# Patient Record
Sex: MALE | Race: WHITE | NOT HISPANIC OR LATINO | Employment: OTHER | URBAN - NONMETROPOLITAN AREA
[De-identification: names, ages, dates, MRNs, and addresses within clinical notes are randomized per-mention and may not be internally consistent; named-entity substitution may affect disease eponyms.]

---

## 2017-09-06 ENCOUNTER — COMMUNICATION - GICH (OUTPATIENT)
Dept: SURGERY | Facility: OTHER | Age: 47
End: 2017-09-06

## 2017-11-14 ENCOUNTER — HISTORY (OUTPATIENT)
Dept: FAMILY MEDICINE | Facility: OTHER | Age: 47
End: 2017-11-14

## 2017-11-14 ENCOUNTER — HOSPITAL ENCOUNTER (OUTPATIENT)
Dept: RADIOLOGY | Facility: OTHER | Age: 47
End: 2017-11-14
Attending: FAMILY MEDICINE

## 2017-11-14 ENCOUNTER — OFFICE VISIT - GICH (OUTPATIENT)
Dept: FAMILY MEDICINE | Facility: OTHER | Age: 47
End: 2017-11-14

## 2017-11-14 DIAGNOSIS — G89.29 OTHER CHRONIC PAIN: ICD-10-CM

## 2017-11-14 DIAGNOSIS — M54.41 LOW BACK PAIN WITH RIGHT-SIDED SCIATICA: ICD-10-CM

## 2017-11-14 DIAGNOSIS — M53.3 SACROCOCCYGEAL DISORDERS, NOT ELSEWHERE CLASSIFIED: ICD-10-CM
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
## 2017-12-28 NOTE — ADDENDUM NOTE
Patient Information     Patient Name MRN Sex Devon Sheikh [id number] Male 1970      Addendum Note by Juan Antonio Palm MD at 2017 11:16 AM     Author:  Juan Antonio Palm MD Service:  (none) Author Type:  Physician     Filed:  2017 11:16 AM Encounter Date:  2017 Status:  Signed     :  Juan Antonio Palm MD (Physician)       Addended by: JUAN ANTONIO PALM on: 2017 11:16 AM        Modules accepted: Orders

## 2017-12-30 NOTE — NURSING NOTE
Patient Information     Patient Name MRN Devon Gallagher [id number] Male 1970      Nursing Note by Lucy Nj at 2017 10:45 AM     Author:  Lucy Nj Service:  (none) Author Type:  (none)     Filed:  2017 10:48 AM Encounter Date:  2017 Status:  Signed     :  Lucy Nj            Patient presents to the clinic with stabbing back pain.  It's happening more frequently lately.  Lucy Nj LPN....................2017 10:42 AM

## 2018-01-22 ENCOUNTER — DOCUMENTATION ONLY (OUTPATIENT)
Dept: FAMILY MEDICINE | Facility: OTHER | Age: 48
End: 2018-01-22

## 2018-01-22 PROBLEM — G57.52 TARSAL TUNNEL SYNDROME, LEFT: Status: ACTIVE | Noted: 2018-01-22

## 2018-01-22 RX ORDER — PREDNISONE 20 MG/1
TABLET ORAL
COMMUNITY
Start: 2017-11-14 | End: 2018-07-05

## 2018-01-22 RX ORDER — FLUOCINONIDE 0.5 MG/G
CREAM TOPICAL 2 TIMES DAILY
COMMUNITY
Start: 2016-05-09 | End: 2018-07-05

## 2018-01-22 RX ORDER — NICOTINE POLACRILEX 4 MG/1
20 GUM, CHEWING ORAL DAILY PRN
COMMUNITY
End: 2022-06-06

## 2018-01-22 RX ORDER — IBUPROFEN 600 MG/1
600 TABLET, FILM COATED ORAL
COMMUNITY
Start: 2014-01-06 | End: 2018-07-05

## 2018-01-26 VITALS
BODY MASS INDEX: 34.8 KG/M2 | WEIGHT: 271.2 LBS | SYSTOLIC BLOOD PRESSURE: 136 MMHG | HEIGHT: 74 IN | DIASTOLIC BLOOD PRESSURE: 86 MMHG

## 2018-07-05 ENCOUNTER — OFFICE VISIT (OUTPATIENT)
Dept: FAMILY MEDICINE | Facility: OTHER | Age: 48
End: 2018-07-05
Attending: FAMILY MEDICINE
Payer: COMMERCIAL

## 2018-07-05 VITALS
HEART RATE: 72 BPM | DIASTOLIC BLOOD PRESSURE: 78 MMHG | SYSTOLIC BLOOD PRESSURE: 154 MMHG | BODY MASS INDEX: 33.88 KG/M2 | WEIGHT: 264 LBS

## 2018-07-05 DIAGNOSIS — M54.42 ACUTE LEFT-SIDED LOW BACK PAIN WITH LEFT-SIDED SCIATICA: Primary | ICD-10-CM

## 2018-07-05 PROCEDURE — 99213 OFFICE O/P EST LOW 20 MIN: CPT | Performed by: FAMILY MEDICINE

## 2018-07-05 RX ORDER — PREDNISONE 20 MG/1
20 TABLET ORAL 2 TIMES DAILY
Qty: 10 TABLET | Refills: 0 | Status: SHIPPED | OUTPATIENT
Start: 2018-07-05 | End: 2019-04-04

## 2018-07-05 NOTE — PROGRESS NOTES
SUBJECTIVE:   Devon Kenney is a 48 year old male who presents to clinic today for the following health issues:  Has had a flare of radicular back pain after bending over to attach a trailer chain at home. It is improving now but he had 2 episodes of loss of bladder control. He feels it was absolutely related to pain jolts and not a neurologis event and he does not have trouble controlling bladder any other time. No BM issues. He had MR 4 years ago w similar sx that was good. He is a  and works hard.      Problem list and histories reviewed & adjusted, as indicated.  Additional history: as documented        Reviewed and updated as needed this visit by clinical staff  Tobacco  Allergies  Meds       Reviewed and updated as needed this visit by Provider         OBJECTIVE:     /78 (BP Location: Right arm, Patient Position: Sitting)  Pulse 72  Wt 264 lb (119.7 kg)  BMI 33.88 kg/m2  Body mass index is 33.88 kg/(m^2).  GENERAL: healthy, alert and no distress  MS: no gross musculoskeletal defects noted, no edema/ has good heel and toe walking and normal active DTRs  He moves slowly and cautiously      ASSESSMENT/PLAN:       1. Acute left-sided low back pain with left-sided sciatica  Prednisone- 5 days as he does not want to be on it when he goes back to work next Wed/ ice/ wt loss/       See Patient Instructions    CANDELARIO GAMBINO MD  [facility]

## 2018-07-05 NOTE — MR AVS SNAPSHOT
"              After Visit Summary   2018    Devon Kenney    MRN: [id number]           Patient Information     Date Of Birth          1970        Visit Information        Provider Department      2018 7:45 AM Juan Antonio Palm MD [facility]        Today's Diagnoses     Acute left-sided low back pain with left-sided sciatica    -  1       Follow-ups after your visit        Who to contact     If you have questions or need follow up information about today's clinic visit or your schedule please contact [facility] directly at [phone].  Normal or non-critical lab and imaging results will be communicated to you by Eurotrihart, letter or phone within 4 business days after the clinic has received the results. If you do not hear from us within 7 days, please contact the clinic through Eurotrihart or phone. If you have a critical or abnormal lab result, we will notify you by phone as soon as possible.  Submit refill requests through MetroGames or call your pharmacy and they will forward the refill request to us. Please allow 3 business days for your refill to be completed.          Additional Information About Your Visit        MyChart Information     MetroGames lets you send messages to your doctor, view your test results, renew your prescriptions, schedule appointments and more. To sign up, go to [email or web address] . Click on \"Log in\" on the left side of the screen, which will take you to the Welcome page. Then click on \"Sign up Now\" on the right side of the page.     You will be asked to enter the access code listed below, as well as some personal information. Please follow the directions to create your username and password.     Your access code is: [email or web address]  Expires: 10/3/2018  8:39 AM     Your access code will  in 90 days. If you need help or a new code, please call your [place] clinic or [phone].        Care EveryWhere ID     This is your Care " EveryWhere ID. This could be used by other organizations to access your [place] medical records  [id number]        Your Vitals Were     Pulse BMI (Body Mass Index)                72 33.88 kg/m2           Blood Pressure from Last 3 Encounters:   07/05/18 154/78   11/14/17 136/86   05/09/16 124/88    Weight from Last 3 Encounters:   07/05/18 264 lb (119.7 kg)   11/14/17 271 lb 3.2 oz (123 kg)   05/09/16 274 lb 9.6 oz (124.6 kg)              Today, you had the following     No orders found for display         Today's Medication Changes          These changes are accurate as of 7/5/18  8:39 AM.  If you have any questions, ask your nurse or doctor.               These medicines have changed or have updated prescriptions.        Dose/Directions    predniSONE 20 MG tablet   Commonly known as:  DELTASONE   This may have changed:    - how much to take  - how to take this  - when to take this  - additional instructions   Used for:  Acute left-sided low back pain with left-sided sciatica   Changed by:  Juan Antonio Palm MD        Dose:  20 mg   Take 1 tablet (20 mg) by mouth 2 times daily   Quantity:  10 tablet   Refills:  0         Stop taking these medicines if you haven't already. Please contact your care team if you have questions.     fluocinonide 0.05 % cream   Commonly known as:  LIDEX   Stopped by:  Juan Antonio Palm MD           ibuprofen 600 MG tablet   Commonly known as:  ADVIL/MOTRIN   Stopped by:  Juan Antonio Palm MD                Where to get your medicines      These medications were sent to [place] [address] [facility] - [place], [place] - [address]  [address] [facility] [address]     Phone:  [phone]     predniSONE 20 MG tablet                Primary Care Provider Office Phone # Fax #    Juan Antonio Palm [phone]       [address] [facility] [address]        Equal Access to Services     HUNG BARCLAY AH: lisha Monge,  antonella kennedydae estela abundiothanh hydeliudmila sahu ah. So [facility] [phone].    ATENCIÓN: Si noemy whaley, tiene a valladares disposición servicios gratuitos de asistencia lingüística. Lukas al [phone].    We comply with applicable federal civil rights laws and [place] laws. We do not discriminate on the basis of race, color, national origin, age, disability, sex, sexual orientation, or gender identity.            Thank you!     Thank you for choosing [facility] AND [facility]  for your care. Our goal is always to provide you with excellent care. Hearing back from our patients is one way we can continue to improve our services. Please take a few minutes to complete the written survey that you may receive in the mail after your visit with us. Thank you!             Your Updated Medication List - Protect others around you: Learn how to safely use, store and throw away your medicines at www.disposemymeds.org.          This list is accurate as of 7/5/18  8:39 AM.  Always use your most recent med list.                   Brand Name Dispense Instructions for use Diagnosis    omeprazole 20 MG tablet      Take 20 mg by mouth daily as needed        predniSONE 20 MG tablet    DELTASONE    10 tablet    Take 1 tablet (20 mg) by mouth 2 times daily    Acute left-sided low back pain with left-sided sciatica

## 2018-07-05 NOTE — NURSING NOTE
Patient here for ongoing lower back pain. He is currently not taking anything for the pain now. Jeni Patton LPN .......................7/5/2018  7:51 AM

## 2018-07-23 NOTE — PROGRESS NOTES
Patient Information     Patient Name  Devon Kenney MRN  [id number] Sex  Male   1970      Letter by Juan Antonio Palm MD at      Author:  Juan Antonio Palm MD Service:  (none) Author Type:  (none)    Filed:   Date of Service:   Status:  (Other)           Devon Kenney  [address] [facility] [address]          2017    Dear Mr. Kenney:    Your do have degenerative arthritis in your lower back which is no surprise. There were no unexpected findings in your xrays.  Juan Antonio Palm MD ....................  2017   2:41 PM

## 2018-07-23 NOTE — PROGRESS NOTES
Patient Information     Patient Name  Devon Kenney MRN  [id number] Sex  Male   1970      Letter by Gabi Gallagher at      Author:  Gabi Gallagher Service:  (none) Author Type:  (none)    Filed:   Encounter Date:  2017 Status:  (Other)           [facility]  [address] [facility] [address]          2017    Dear Mr. Kenney:    It has come to our attention that you are due for a colonoscopy.  According to our records, your last colonoscopy was done on 10/23/2012.  It  is time for your repeat colonoscopy.  Please contact the Surgery department at [phone] and we will be happy to set up this colonoscopy for you.  If you have had a colonoscopy since your last one with us, please let us know so we can update our records.      Sincerely,       Gabi Gallagher  General Surgery      Reviewed and electronically signed by provider.

## 2019-04-04 ENCOUNTER — OFFICE VISIT (OUTPATIENT)
Dept: PEDIATRICS | Facility: OTHER | Age: 49
End: 2019-04-04
Attending: INTERNAL MEDICINE
Payer: COMMERCIAL

## 2019-04-04 VITALS
DIASTOLIC BLOOD PRESSURE: 86 MMHG | WEIGHT: 278 LBS | HEIGHT: 74 IN | BODY MASS INDEX: 35.68 KG/M2 | OXYGEN SATURATION: 98 % | SYSTOLIC BLOOD PRESSURE: 142 MMHG | RESPIRATION RATE: 16 BRPM | TEMPERATURE: 98.7 F | HEART RATE: 94 BPM

## 2019-04-04 DIAGNOSIS — Z87.891 HISTORY OF TOBACCO USE: ICD-10-CM

## 2019-04-04 DIAGNOSIS — R05.3 CHRONIC COUGH: Primary | ICD-10-CM

## 2019-04-04 PROCEDURE — 99213 OFFICE O/P EST LOW 20 MIN: CPT | Performed by: INTERNAL MEDICINE

## 2019-04-04 RX ORDER — BENZONATATE 100 MG/1
100 CAPSULE ORAL 3 TIMES DAILY PRN
Qty: 90 CAPSULE | Refills: 1 | Status: SHIPPED | OUTPATIENT
Start: 2019-04-04 | End: 2022-06-06

## 2019-04-04 ASSESSMENT — MIFFLIN-ST. JEOR: SCORE: 2191

## 2019-04-04 ASSESSMENT — PAIN SCALES - GENERAL: PAINLEVEL: NO PAIN (0)

## 2019-04-04 NOTE — PATIENT INSTRUCTIONS
-- Schedule breathing test   -- Schedule lung CT   -- consider neck imaging   -- Trial of Tessalon, first at night. Watch for drowsiness.   -- Neti pot   -- Establish with a therapist   -- Follow-up in a few weeks after testing    [place] counselors/therapists   Telephone Hours Kids? Address   [facility]  (Many counselors) [phone] M-Th 8-5  F 8-12 Yes [address]   [email or web address]   [facility]  (Many counselors) [phone]  Yes [address]   [email or web address]   [facility]  (Many counselors) [phone]  Yes [address] [place]  [email or web address]://[email or web address]   [facility]  Bill Ankush Guevara [phone]  Yes [address]   [address]  [email or web address]   [facility]  Christos Kay [phone]  Yes [address]   Uzma Cortes [phone]   [address]  [email or web address]   Tiffanie Singleton [phone]   [address]   Rosa Gibson [phone]   [address] [place] [address]   Maren Kong [phone]  Yes [address]   Kimberlydanielle Deyvi [phone]   [address] [facility] [place]    Dequan Mckinnon [phone]   [address] [place] [address]   Shellie Randall [phone]   [address]   [address] [place] [address]   [email or web address]   Odette Zheng [phone]   [address] [place] [address] [address]  ([facility])  [email or web address]   [facility]  David Chadwick [phone]   [address] [facility]  Michele Rinne Cindy Thomas [phone]      [facility]  Darryl Ghosh CNP [phone] M-F 8-5 Yes [address] [place], [place]  [email or web address]   [facility] [place] [phone]  Yes TORRIE Guerra  [address]  http://[email or web address]/   [facility] [place] [phone]  Yes TORRIE Lozano  [address] [place] [address]  [email or web address]://[email or web address]

## 2019-04-04 NOTE — PROGRESS NOTES
Subjective  Devon Kenney is a 48 year old male who presents for approximately 1 year of cough. He associates the onset of his cough with a very stressful time at work. However, since the time of onset he feels a constant need to adjust his neck and clear his throat. He notes that his cough worsens throughout the day, worsens with heavy breathing, keeps him awake many nights, is associated with a foul scent a few times a week that he knows is not due to him or his surroundings at the time, and he has occasional voice changes, and a sensation of a dry throat. He denies any subjective fevers, N/V/D, hemoptysis or other production, wheezing, seasonal allergies, weight loss, travel outside [place], heavy alcohol use (at this time), visual changes, urinary changes, abdominal/chest pain, anxiety, or painful/difficult swallowing. He does endorse headaches with tunnel vision, an 18 year smoking history, past consumption of alcohol, reflux that bothers him separate from his cough, and trouble sleeping. He has been taking omeprazole for several years and has severe reflux symptoms if he misses a dose.   Carlton has brings up that he is very hard on his body and is often sleep deprived. He is concerned that his cough could also be due to neck strain/pain that he says has been brought to the attention of a doctor in the past.     Problem List/PMH: reviewed in EMR, and made relevant updates today.  Medications: reviewed in EMR, and made relevant updates today.  Allergies: reviewed in EMR, and made relevant updates today.    Social Hx:  Social History     Tobacco Use     Smoking status: Former Smoker     Packs/day: 2.00     Years: 18.00     Pack years: 36.00     Types: Cigarettes     Last attempt to quit: 10/15/2006     Years since quittin.4     Smokeless tobacco: Never Used   Substance Use Topics     Alcohol use: Yes     Alcohol/week: 1.2 oz     Types: 2 Cans of beer per week     Drug use: No     Social History     Social History  "Narrative    Two children.    Works as an  for [employer].     I reviewed social history and made relevant updates today.    Family Hx:   Family History   Problem Relation Age of Onset     Colon Cancer Paternal Grandfather         Cancer-colon     Prostate Cancer Paternal Grandfather         Cancer-prostate     Breast Cancer Mother         Cancer-breast     Other - See Comments Mother          lymphoma     Hypertension Maternal Grandmother         Hypertension     Genetic Disorder Other         Genetic,No family history of diabetes or coronary disease.-Grandfather  Colon and prostate cancer but lived into his 90s.  -Mother Breast cancer and lymphoma. -Grandmother Hypertension.  -Son Autism.     Other - See Comments Son         Autism     Asthma No family hx of        Objective  Vitals: reviewed in EMR.  /86   Pulse 94   Temp 98.7  F (37.1  C) (Tympanic)   Resp 16   Ht 1.88 m (6' 2.02\")   Wt 126.1 kg (278 lb)   SpO2 98%   BMI 35.68 kg/m      Gen: Pleasant male, NAD.  HEENT: PERRLA, MMM, no OP erythema. Ear canals and TMs without erythema or discharge. There is no pre/post-auricular/submandiubular/cervical/supraclavicular/axillary lymphadenopathy.   Neck: Supple, no JVD. The patient is mildly tender to palpation just lateral to the trachea on the L side, and reports feeling a sensation of fullness that radiates to the posterior neck.  CV: RRR no m/r/g.   Pulm: CTAB no w/r/r   GI: Soft, NT, ND. No HSM. No masses. Bowel sounds present.  Neuro: Grossly intact  Msk: No lower extremity edema.  Skin: No concerning lesions.  Psychiatric: Normal affect and insight. Does not appear anxious or depressed.    Assessment    ICD-10-CM    1. Chronic cough R05 Pulmonary Function Test ()     CT Chest Low Dose Non Contrast     benzonatate (TESSALON) 100 MG capsule   2. History of tobacco use Z87.891 Pulmonary Function Test ()     CT Chest Low Dose Non Contrast     benzonatate (TESSALON) " "100 MG capsule     Orders Placed This Encounter   Procedures     CT Chest Low Dose Non Contrast     Pulmonary Function Test ()       Devon Kenney is a 48 year old male who presents for approximately 1 year of cough. The differential is broad. Given the patient's smoking history COPD and/or asthma should be ruled out via PFTs. Also because of his significant smoking history he should undergo a low-dose CT scan to rule out lung malignancies. His smoking in addition to previous alcohol use also puts him at risk for SCC or other throat cancer, which should be investigated via head/neck CT if workup is otherwise negative. Psychogenic \"habit\" cough is possible given that he associates the cough onset with a difficult time at work, so he may benefit from counseling. Carlton's GERD seems to be well controlled with omeprazole and the symptoms appear at different times, but increasing his PPI dose could also be considered. Although he does not endorse having allergies a Neti-pot and other allergen avoidence maybe beneficial. Musculoskeletal etiologies are also possible considering that Carlton is very hard on his body.    Plan   -- Expected clinical course discussed   -- Medications and their side effects discussed  Patient Instructions      -- Schedule breathing test   -- Schedule lung CT   -- consider neck imaging   -- Trial of Tessalon, first at night. Watch for drowsiness.   -- Neti pot   -- Establish with a therapist   -- Follow-up in a few weeks after testing    [place] counselors/therapists   Telephone Hours Kids? Address   [facility]  (Many counselors) [phone] M-Th 8-5  F 8-12 Yes [address] [place]   [email or web address]   [facility]  (Many counselors) [phone]  Yes [address]   [email or web address]://[email or web address]   [facility]  (Many counselors) [phone]  Yes [address] [place]  http://[email or web address]   Ankush Khan " Ankush Guevara [phone]  Yes [address]   [address]  [email or web address]   [facility]  Christos Kay [phone]  Yes [address]   Uzma Cortes [phone]   [address]  [email or web address]   Tiffanie Singleton [phone]   [address]   Rosa Gibson [phone]   [address]   Maren Kong [phone]  Yes [address]   Benji Carnes [phone]   [address] [place]    Dequan Mckinnon [phone]   [address]   Shellie Pereira [phone]   [address]   [email or web address]   Odette Zheng [phone]   [address] [facility] [address]  ([facility])  [email or web address]   [facility]  David Chadwick [phone]   [address] [facility]  Michele Rinne Cindy Thomas [phone]      [facility]  Darryl Ghosh CNP [phone] M-F 8-5 Yes [address] [place], [place]  [email or web address]   [facility] [place] [phone]  Yes TORRIE Guerra  [address]  [email or web address]://[email or web address]   [facility] [place] [phone]  Yes TORRIE Lozano  [address] [facility]  [email or web address]         Return in about 1 month (around 5/4/2019) for follow-up results.    Murray Ritchie MS3  [employer]    Attestation Statement:  I was present with the medical student who participated in the service and in the documentation of this note. I have verified the history and personally performed the physical exam and medical decision making, and have verified the content of the note which accurately reflects my assessment of the patient and the plan of care.    Js Dawson MD  Internal Medicine & Pediatrics  4/6/2019 3:59 PM

## 2019-04-04 NOTE — NURSING NOTE
Patient presents to clinic for ongoing cough that has been going on for 8-10.  Adrienne Wiley LPN ....................  4/4/2019   11:05 AM    No LMP for male patient.  Medication Reconciliation: complete    Adrienne Wiley LPN  4/4/2019 11:05 AM

## 2019-04-10 ENCOUNTER — HOSPITAL ENCOUNTER (OUTPATIENT)
Dept: CT IMAGING | Facility: OTHER | Age: 49
Discharge: HOME | End: 2019-04-10
Attending: INTERNAL MEDICINE | Admitting: INTERNAL MEDICINE
Payer: COMMERCIAL

## 2019-04-10 DIAGNOSIS — Z87.891 HISTORY OF TOBACCO USE: ICD-10-CM

## 2019-04-10 DIAGNOSIS — R05.3 CHRONIC COUGH: ICD-10-CM

## 2019-04-10 PROCEDURE — 71250 CT THORAX DX C-: CPT

## 2019-05-02 ENCOUNTER — HOSPITAL ENCOUNTER (OUTPATIENT)
Dept: RESPIRATORY THERAPY | Facility: OTHER | Age: 49
Discharge: HOME | End: 2019-05-02
Attending: INTERNAL MEDICINE | Admitting: INTERNAL MEDICINE
Payer: COMMERCIAL

## 2019-05-02 DIAGNOSIS — R05.3 CHRONIC COUGH: ICD-10-CM

## 2019-05-02 DIAGNOSIS — Z87.891 HISTORY OF TOBACCO USE: ICD-10-CM

## 2019-05-02 PROCEDURE — 94729 DIFFUSING CAPACITY: CPT | Mod: 26 | Performed by: INTERNAL MEDICINE

## 2019-05-02 PROCEDURE — 94729 DIFFUSING CAPACITY: CPT

## 2019-05-02 PROCEDURE — 94010 BREATHING CAPACITY TEST: CPT

## 2019-05-02 PROCEDURE — 94726 PLETHYSMOGRAPHY LUNG VOLUMES: CPT | Mod: 26 | Performed by: INTERNAL MEDICINE

## 2019-05-02 PROCEDURE — 40000275 ZZH STATISTIC RCP TIME EA 10 MIN

## 2019-05-02 PROCEDURE — 94010 BREATHING CAPACITY TEST: CPT | Mod: 26 | Performed by: INTERNAL MEDICINE

## 2019-05-02 PROCEDURE — 94726 PLETHYSMOGRAPHY LUNG VOLUMES: CPT

## 2019-06-18 ENCOUNTER — OFFICE VISIT (OUTPATIENT)
Dept: PEDIATRICS | Facility: OTHER | Age: 49
End: 2019-06-18
Attending: INTERNAL MEDICINE
Payer: COMMERCIAL

## 2019-06-18 VITALS
OXYGEN SATURATION: 97 % | TEMPERATURE: 98 F | SYSTOLIC BLOOD PRESSURE: 150 MMHG | WEIGHT: 274 LBS | BODY MASS INDEX: 35.16 KG/M2 | RESPIRATION RATE: 20 BRPM | DIASTOLIC BLOOD PRESSURE: 82 MMHG | HEART RATE: 93 BPM

## 2019-06-18 DIAGNOSIS — K21.9 GASTROESOPHAGEAL REFLUX DISEASE, ESOPHAGITIS PRESENCE NOT SPECIFIED: ICD-10-CM

## 2019-06-18 DIAGNOSIS — R05.3 CHRONIC COUGH: Primary | ICD-10-CM

## 2019-06-18 PROCEDURE — 99213 OFFICE O/P EST LOW 20 MIN: CPT | Performed by: INTERNAL MEDICINE

## 2019-06-18 NOTE — NURSING NOTE
Chief Complaint   Patient presents with     Follow Up     PFT/cough         Medication Reconciliation: complete    Juhi Montejo, LPN

## 2019-06-18 NOTE — PROGRESS NOTES
Subjective  Devon Kenney is a 48 year old male who presents for follow-up cough, breathing test.  He continues to have a tickling cough.  He feels like something is present in the left side of his throat.  Chronic heartburn which is well controlled with omeprazole.  If he misses a dose he realizes it because the heartburn worsens.  No dysphasia or odynophagia.  He typically sleeps on his left side.  He notices that if he gets real angry at the dog he has whistling in his breath.  He thinks it is inhaling and exhaling.    Problem List/PMH: reviewed in EMR, and made relevant updates today.  Medications: reviewed in EMR, and made relevant updates today.  Allergies: reviewed in EMR, and made relevant updates today.    Social Hx:  Social History     Tobacco Use     Smoking status: Former Smoker     Packs/day: 2.00     Years: 18.00     Pack years: 36.00     Types: Cigarettes     Last attempt to quit: 10/15/2006     Years since quittin.6     Smokeless tobacco: Never Used   Substance Use Topics     Alcohol use: Yes     Alcohol/week: 1.2 oz     Types: 2 Cans of beer per week     Drug use: No     Social History     Social History Narrative    Two children.    Works as an  for [employer].     I reviewed social history and made relevant updates today.    Family Hx:   Family History   Problem Relation Age of Onset     Colon Cancer Paternal Grandfather         Cancer-colon     Prostate Cancer Paternal Grandfather         Cancer-prostate     Breast Cancer Mother         Cancer-breast     Other - See Comments Mother          lymphoma     Hypertension Maternal Grandmother         Hypertension     Genetic Disorder Other         Genetic,No family history of diabetes or coronary disease.-Grandfather  Colon and prostate cancer but lived into his 90s.  -Mother Breast cancer and lymphoma. -Grandmother Hypertension.  -Son Autism.     Other - See Comments Son         Autism     Asthma No family hx of         Objective  Vitals: reviewed in EMR.  /82 (BP Location: Right arm, Patient Position: Sitting, Cuff Size: Adult Large)   Pulse 93   Temp 98  F (36.7  C)   Resp 20   Wt 124.3 kg (274 lb)   SpO2 97%   BMI 35.16 kg/m      Gen: Pleasant male, NAD.  HEENT: MMM, no OP erythema.   Neck: Supple, no masses, no palpable lymphadenopathy.  CV: RRR no m/r/g.   Pulm: CTAB no w/r/r  Neuro: Grossly intact  Msk: No lower extremity edema.  Skin: No concerning lesions.  Psychiatric: Normal affect and insight. Does not appear anxious or depressed.    Pulmonary function testing dated May 2, 2019 study and report was personally reviewed with the patient today.      Assessment    ICD-10-CM    1. Chronic cough R05 OTOLARYNGOLOGY REFERRAL   2. Gastroesophageal reflux disease, esophagitis presence not specified K21.9 OTOLARYNGOLOGY REFERRAL     Orders Placed This Encounter   Procedures     OTOLARYNGOLOGY REFERRAL       Differential diagnosis includes gastroesophageal reflux disease with pharyngeal inflammation, oral pharyngeal malignancy, vocal cord dysfunction, others.    Plan   -- Expected clinical course discussed   -- Medications and their side effects discussed  Patient Instructions    -- ENT consult to consider GERD ulcer on voice box vs vocal cord dysfunction vs tumor or other      Signed, Js Patel MD  Internal Medicine & Pediatrics

## 2019-07-30 ENCOUNTER — OFFICE VISIT (OUTPATIENT)
Dept: OTOLARYNGOLOGY | Facility: OTHER | Age: 49
End: 2019-07-30
Attending: OTOLARYNGOLOGY
Payer: COMMERCIAL

## 2019-07-30 DIAGNOSIS — K21.9 LPRD (LARYNGOPHARYNGEAL REFLUX DISEASE): Primary | ICD-10-CM

## 2019-07-30 PROCEDURE — G0463 HOSPITAL OUTPT CLINIC VISIT: HCPCS

## 2019-08-05 NOTE — PROGRESS NOTES
document embedded image  Patient Name:  Devon Kenney  YOB: 1970  MR Number:  [id number]  Acct Number: [id number]    cc: ~    ENT Progress Note    Date: 07/30/19    Visit Reasons: Throat issues/voice change when talking loud    HPI  HPI  HPI: Chief complaint:  Throat complaints    History  The patient is a 49-year-old patient with known GERD who comes to the office today with variable voice issues, throat clearing, and intermittent tight feeling in his hypopharynx when he swallows.  He is a former smoker.  He quit 12 years ago.  He does have fears possible malignancy.  He denies hemoptysis, hematemesis, dysphagia, odynophagia, otalgia.    Exam  Oral cavity oropharynx-free of mucosal lesion  Nasal-no obstruction or purulence  Neck-no masses or adenopathy  Head neck integument-Clear  Indirect laryngoscopy-he has posterior laryngeal erythema and edema without evidence of a neoplasm.  His larynx is neurologically intact.  His hypopharynx is clear.  General-the patient appears well and in no distress  Neuro-there are no focal cranial nerve deficits appreciated    Allergies    No Known Allergies Allergy (Unverified 07/31/19 09:29)    PFSH  PFSH  PFSH:   Medical History (Reviewed 07/31/19 @ 09:29 by Jeni Kong CMA)    Diagnosis unknown (Acute)  no eCW Hx  Tinnitus (Acute)    Family History (Reviewed 07/31/19 @ 09:29 by Jeni Kong CMA)  Other  Cancer        Social History  Smoking Status:  Former smoker        A&P  Assessment & Plan  (1) LPRD (laryngopharyngeal reflux disease):         Code(s):  K21.9 - Gastro-esophageal reflux disease without esophagitis        The patient was counseled that his symptoms are most likely secondary to breakthrough reflux.  We will increase his omeprazole to twice daily for 3 months.  He is welcome to follow up if this does not resolve his issues.  He was reassured I see no evidence of head neck malignancy.  Departure  Other Medications:        New:     omeprazole 20 mg  PO BID 90 days 180 caps 0RF            David Linares MD              07/30/19 1014   <Electronically signed by David Linares MD> 08/01/19 0944

## 2019-10-10 ENCOUNTER — RESULTS ONLY (OUTPATIENT)
Dept: LAB | Age: 49
End: 2019-10-10

## 2019-10-10 ENCOUNTER — APPOINTMENT (OUTPATIENT)
Dept: FAMILY MEDICINE | Facility: OTHER | Age: 49
End: 2019-10-10
Attending: CHIROPRACTOR

## 2019-10-10 PROCEDURE — 99499 UNLISTED E&M SERVICE: CPT | Performed by: CHIROPRACTOR

## 2019-10-10 PROCEDURE — 40001068 ZZHCL STATISTIC UA W/ REFLEX TO MICRO GICH: Mod: ZL | Performed by: CHIROPRACTOR

## 2019-10-10 RX ORDER — OMEPRAZOLE 40 MG/1
CAPSULE, DELAYED RELEASE ORAL
Refills: 0 | COMMUNITY
Start: 2019-08-16 | End: 2022-06-06

## 2019-10-11 LAB
ALBUMIN UR-MCNC: NORMAL MG/DL
APPEARANCE UR: NORMAL
BILIRUB UR QL STRIP: NORMAL
COLOR UR AUTO: NORMAL
GLUCOSE UR STRIP-MCNC: NORMAL MG/DL
HGB UR QL STRIP: NORMAL
KETONES UR STRIP-MCNC: NORMAL MG/DL
LEUKOCYTE ESTERASE UR QL STRIP: NORMAL
NITRATE UR QL: NORMAL
PH UR STRIP: NORMAL PH (ref 5–7)
SOURCE: NORMAL
SP GR UR STRIP: NORMAL (ref 1–1.03)
UROBILINOGEN UR STRIP-ACNC: NORMAL EU/DL (ref 0.2–1)

## 2021-12-15 ENCOUNTER — IMMUNIZATION (OUTPATIENT)
Dept: FAMILY MEDICINE | Facility: OTHER | Age: 51
End: 2021-12-15
Attending: FAMILY MEDICINE
Payer: COMMERCIAL

## 2021-12-15 PROCEDURE — 0004A PR COVID VAC PFIZER DIL RECON 30 MCG/0.3 ML IM: CPT

## 2021-12-15 PROCEDURE — 91300 PR COVID VAC PFIZER DIL RECON 30 MCG/0.3 ML IM: CPT

## 2022-06-06 ENCOUNTER — OFFICE VISIT (OUTPATIENT)
Dept: PEDIATRICS | Facility: OTHER | Age: 52
End: 2022-06-06
Attending: INTERNAL MEDICINE
Payer: COMMERCIAL

## 2022-06-06 VITALS
HEART RATE: 76 BPM | HEIGHT: 73 IN | WEIGHT: 228.7 LBS | BODY MASS INDEX: 30.31 KG/M2 | TEMPERATURE: 97.5 F | DIASTOLIC BLOOD PRESSURE: 88 MMHG | OXYGEN SATURATION: 99 % | RESPIRATION RATE: 16 BRPM | SYSTOLIC BLOOD PRESSURE: 146 MMHG

## 2022-06-06 DIAGNOSIS — R10.13 ABDOMINAL PAIN, EPIGASTRIC: Primary | ICD-10-CM

## 2022-06-06 DIAGNOSIS — K52.9 CHRONIC DIARRHEA: ICD-10-CM

## 2022-06-06 DIAGNOSIS — K21.00 GASTROESOPHAGEAL REFLUX DISEASE WITH ESOPHAGITIS, UNSPECIFIED WHETHER HEMORRHAGE: ICD-10-CM

## 2022-06-06 LAB
ALBUMIN SERPL-MCNC: 4.5 G/DL (ref 3.5–5.7)
ALP SERPL-CCNC: 35 U/L (ref 34–104)
ALT SERPL W P-5'-P-CCNC: 17 U/L (ref 7–52)
AMYLASE SERPL-CCNC: 32 U/L (ref 29–103)
ANION GAP SERPL CALCULATED.3IONS-SCNC: 5 MMOL/L (ref 3–14)
AST SERPL W P-5'-P-CCNC: 16 U/L (ref 13–39)
BASOPHILS # BLD MANUAL: 0 10E3/UL (ref 0–0.2)
BASOPHILS NFR BLD MANUAL: 0 %
BILIRUB SERPL-MCNC: 0.5 MG/DL (ref 0.3–1)
BUN SERPL-MCNC: 15 MG/DL (ref 7–25)
CALCIUM SERPL-MCNC: 9.6 MG/DL (ref 8.6–10.3)
CHLORIDE BLD-SCNC: 106 MMOL/L (ref 98–107)
CO2 SERPL-SCNC: 29 MMOL/L (ref 21–31)
CREAT SERPL-MCNC: 1.01 MG/DL (ref 0.7–1.3)
CRP SERPL-MCNC: 6.3 MG/L
EOSINOPHIL # BLD MANUAL: 0 10E3/UL (ref 0–0.7)
EOSINOPHIL NFR BLD MANUAL: 0 %
ERYTHROCYTE [DISTWIDTH] IN BLOOD BY AUTOMATED COUNT: 12.1 % (ref 10–15)
ERYTHROCYTE [SEDIMENTATION RATE] IN BLOOD BY WESTERGREN METHOD: 4 MM/HR (ref 0–20)
GFR SERPL CREATININE-BSD FRML MDRD: 89 ML/MIN/1.73M2
GLUCOSE BLD-MCNC: 102 MG/DL (ref 70–105)
HCT VFR BLD AUTO: 47.2 % (ref 40–53)
HGB BLD-MCNC: 16.4 G/DL (ref 13.3–17.7)
LIPASE SERPL-CCNC: 22 U/L (ref 11–82)
LYMPHOCYTES # BLD MANUAL: 4.9 10E3/UL (ref 0.8–5.3)
LYMPHOCYTES NFR BLD MANUAL: 47 %
MCH RBC QN AUTO: 32 PG (ref 26.5–33)
MCHC RBC AUTO-ENTMCNC: 34.7 G/DL (ref 31.5–36.5)
MCV RBC AUTO: 92 FL (ref 78–100)
MONOCYTES # BLD MANUAL: 0.2 10E3/UL (ref 0–1.3)
MONOCYTES NFR BLD MANUAL: 2 %
NEUTROPHILS # BLD MANUAL: 5.3 10E3/UL (ref 1.6–8.3)
NEUTROPHILS NFR BLD MANUAL: 51 %
PLAT MORPH BLD: ABNORMAL
PLATELET # BLD AUTO: 165 10E3/UL (ref 150–450)
POTASSIUM BLD-SCNC: 4.9 MMOL/L (ref 3.5–5.1)
PROT SERPL-MCNC: 7.2 G/DL (ref 6.4–8.9)
RBC # BLD AUTO: 5.13 10E6/UL (ref 4.4–5.9)
RBC MORPH BLD: ABNORMAL
SMUDGE CELLS BLD QL SMEAR: PRESENT
SODIUM SERPL-SCNC: 140 MMOL/L (ref 134–144)
TSH SERPL DL<=0.005 MIU/L-ACNC: 2.57 MU/L (ref 0.4–4)
WBC # BLD AUTO: 10.4 10E3/UL (ref 4–11)

## 2022-06-06 PROCEDURE — 86803 HEPATITIS C AB TEST: CPT | Mod: ZL | Performed by: INTERNAL MEDICINE

## 2022-06-06 PROCEDURE — 99214 OFFICE O/P EST MOD 30 MIN: CPT | Performed by: INTERNAL MEDICINE

## 2022-06-06 PROCEDURE — 82150 ASSAY OF AMYLASE: CPT | Mod: ZL | Performed by: INTERNAL MEDICINE

## 2022-06-06 PROCEDURE — 85007 BL SMEAR W/DIFF WBC COUNT: CPT | Mod: ZL | Performed by: INTERNAL MEDICINE

## 2022-06-06 PROCEDURE — 83690 ASSAY OF LIPASE: CPT | Mod: ZL | Performed by: INTERNAL MEDICINE

## 2022-06-06 PROCEDURE — 36415 COLL VENOUS BLD VENIPUNCTURE: CPT | Mod: ZL | Performed by: INTERNAL MEDICINE

## 2022-06-06 PROCEDURE — 80053 COMPREHEN METABOLIC PANEL: CPT | Mod: ZL | Performed by: INTERNAL MEDICINE

## 2022-06-06 PROCEDURE — 85652 RBC SED RATE AUTOMATED: CPT | Mod: ZL | Performed by: INTERNAL MEDICINE

## 2022-06-06 PROCEDURE — 84443 ASSAY THYROID STIM HORMONE: CPT | Mod: ZL | Performed by: INTERNAL MEDICINE

## 2022-06-06 PROCEDURE — 86140 C-REACTIVE PROTEIN: CPT | Mod: ZL | Performed by: INTERNAL MEDICINE

## 2022-06-06 PROCEDURE — 85027 COMPLETE CBC AUTOMATED: CPT | Mod: ZL | Performed by: INTERNAL MEDICINE

## 2022-06-06 RX ORDER — SUCRALFATE 1 G/1
1 TABLET ORAL 4 TIMES DAILY
Qty: 300 TABLET | Refills: 3 | Status: SHIPPED | OUTPATIENT
Start: 2022-06-06 | End: 2023-11-06

## 2022-06-06 RX ORDER — FAMOTIDINE 20 MG/1
20 TABLET, FILM COATED ORAL 2 TIMES DAILY PRN
Qty: 180 TABLET | Refills: 3 | Status: SHIPPED | OUTPATIENT
Start: 2022-06-06 | End: 2023-06-06

## 2022-06-06 ASSESSMENT — PAIN SCALES - GENERAL: PAINLEVEL: NO PAIN (0)

## 2022-06-06 NOTE — PROGRESS NOTES
Assessment & Plan   1. Abdominal pain, epigastric  2. Chronic diarrhea  3. Gastroesophageal reflux disease with esophagitis, unspecified whether hemorrhage  Mr. Kenney is a 51-year-old gentleman with a history of several months to a year of upper abdominal discomfort, loose stools and acid reflux.  Differential diagnosis includes inflammatory bowel disease, celiac disease, giardiasis, drinking alcohol to excess, hyperthyroidism, undertreated gastroesophageal reflux disease, chronic pancreatitis, viral or alcoholic hepatitis, gastritis, peptic ulcer disease, eosinophilic esophagitis, others.  An extensive list of serologies and stool sample studies were ordered as outlined below.  We discussed medication options and decided to increase the dose of PPI as well as add sucralfate and famotidine.  If all lab work is returning relatively normal would recommend EGD/colonoscopy as a next step.  If significant abnormalities of LFTs or others may consider imaging first.  Low threshold for expert consultation.  - CBC with Platelets & Differential; Future  - Comprehensive metabolic panel; Future  - Lipase; Future  - Amylase; Future  - TSH; Future  - Hepatitis C Screen Reflex to HCV RNA Quant and Genotype; Future  - CRP inflammation; Future  - Erythrocyte sedimentation rate auto; Future  - Fecal Lactoferrin; Future  - Enteric Bacteria and Virus Panel by [facility] Stool; Future  - Cryptosporidium and Giardia antigens; Future  - Clostridium difficile toxin B PCR; Future  - omeprazole (PRILOSEC) 20 MG DR capsule; Take 1 capsule (20 mg) by mouth 2 times daily  Dispense: 180 capsule; Refill: 3  - sucralfate (CARAFATE) 1 GM tablet; Take 1 tablet (1 g) by mouth 4 times daily  Dispense: 300 tablet; Refill: 3  - famotidine (PEPCID) 20 MG tablet; Take 1 tablet (20 mg) by mouth 2 times daily as needed (GERD)  Dispense: 180 tablet; Refill: 3  - CBC with Platelets & Differential  - Comprehensive metabolic panel  - Lipase  - Amylase  - TSH  -  Hepatitis C Screen Reflex to HCV RNA Quant and Genotype  - CRP inflammation  - Erythrocyte sedimentation rate auto      Patient Instructions      -- Increase omeprazole (Prilosec) to 20 mg twice daily   -- Use famotidine (Pepcid) 20 mg twice daily as needed for heart burn, upset stomach.   -- Use sucralfate (Carafate) 2-3 times per day to allow stomach lining to heal   -- Antacids are okay to use as well as needed (eg Tums)   -- Avoid trigger foods (eg spicy foods, caffeine, alcohol -- see longer list below)   -- Larger meal earlier in the day, smallest meal later in the day   -- Avoid NSAIDs (eg ibuprofen/Advil, naproxen/Aleve)   -- Work on healthy weight   -- Reduce stress in your life   -- After your symptoms have improved (around 30 days) consider stopping omeprazole   -- Okay to continue famotidine as needed     -- if all labs are normal, will order EGD/colonoscopy   -- May consider CT scan abdomen first    Check your Blood Pressure   -- Sit in a chair, feet flat on the floor for 5 minutes   -- Avoid caffeine, exercise and smoking for 30 minutes before checking   -- Have your arm at the level of your heart   -- Make sure you have the correct size cuff   -- Write them down, bring your log book in to your appointment     -- Goal blood pressure 120/70   -- Learn about DASH Diet for dietary ways to reduce blood pressure  1. Google: NIH DASH diet  2. [facility] site: https://www.nhlbi.nih.gov/health-topics/dash-eating-plan  3. PDF from [facility]: https://www.nhlbi.nih.gov/files/docs/public/heart/new_dash.pdf   -- Work on 5% weight loss   -- Daily physical exercise (eg. 30 min brisk walk)          Patient Education   Lifestyle Changes for Controlling GERD  When you have GERD, stomach acid feels as if it s backing up toward your mouth. Whether or not you take medicine to control your GERD, your symptoms can often be improved with lifestyle changes. Talk to your healthcare provider about the following suggestions. These suggestions  may help you get relief from your symptoms.      Raise your head  Reflux is more likely to strike when you re lying down flat, because stomach fluid can flow backward more easily. Raising the head of your bed 4 to 6 inches can help. To do this:    Slide blocks or books under the legs at the head of your bed. Or, place a wedge under the mattress. Many foam stores can make a suitable wedge for you. The wedge should run from your waist to the top of your head.    Don t just prop your head on several pillows. This increases pressure on your stomach. It can make GERD worse.  Watch your eating habits  Certain foods may increase the acid in your stomach or relax the lower esophageal sphincter. This makes GERD more likely. It s best to avoid the following if they cause you symptoms:    Coffee, tea, and carbonated drinks (with and without caffeine)    Fatty, fried, or spicy food    Mint, chocolate, onions, and tomatoes    Peppermint    Any other foods that seem to irritate your stomach or cause you pain  Relieve the pressure  Tips include the following:    Eat smaller meals, even if you have to eat more often.    Don t lie down right after you eat. Wait a few hours for your stomach to empty.    Avoid tight belts and tight-fitting clothes.    Lose excess weight.  Tobacco and alcohol  Avoid smoking tobacco and drinking alcohol. They can make GERD symptoms worse.  Date Last Reviewed: 7/1/2016 2000-2017 [employer]. [address] [place] [address]. All rights reserved. This information is not intended as a substitute for professional medical care. Always follow your healthcare professional's instructions.        Return if symptoms worsen or fail to improve.    Signed, Js Patel MD, FAAP, FACP  Internal Medicine & Pediatrics    Subjective   Devon Kenney is a 51 year old male who presents for ongoing stomach issues.  They have been present for at least 6 to 12 months.  He feels a grumbling in his  "upper abdomen with some discomfort.  It happens on a daily basis.  It seems to be worse in the morning or with lying on his right side and better with laying on his back.  No specific foods seem to bother him.  He had been a  for 30 years and is now retired.  He started drinking coffee after he retired.  He continues to drink a few beers a day although he thinks this is less since snf.  His diet has significantly improved and he has lost 40 pounds.  He attributes this to much fewer restaurant meals.  He has loose stools every day after breakfast.  No red or black stools.  He has around 2 loose stools per day.  No vomiting.  He has acid reflux which is worse if he forgets to take his PPI.    Objective   Vitals: BP (!) 146/88   Pulse 76   Temp 97.5  F (36.4  C) (Tympanic)   Resp 16   Ht 1.854 m (6' 1\")   Wt 103.7 kg (228 lb 11.2 oz)   SpO2 99%   BMI 30.17 kg/m      Cardiovascular: Regular rate and rhythm, no murmur  Pulmonary: Clear  Abdomen: Soft, nondistended.  Bowel sounds hyperactive.  Mild tenderness to palpation throughout the upper half of the abdomen without masses.  No hepatosplenomegaly.      "

## 2022-06-06 NOTE — PATIENT INSTRUCTIONS
-- Increase omeprazole (Prilosec) to 20 mg twice daily   -- Use famotidine (Pepcid) 20 mg twice daily as needed for heart burn, upset stomach.   -- Use sucralfate (Carafate) 2-3 times per day to allow stomach lining to heal   -- Antacids are okay to use as well as needed (eg Tums)   -- Avoid trigger foods (eg spicy foods, caffeine, alcohol -- see longer list below)   -- Larger meal earlier in the day, smallest meal later in the day   -- Avoid NSAIDs (eg ibuprofen/Advil, naproxen/Aleve)   -- Work on healthy weight   -- Reduce stress in your life   -- After your symptoms have improved (around 30 days) consider stopping omeprazole   -- Okay to continue famotidine as needed     -- if all labs are normal, will order EGD/colonoscopy   -- May consider CT scan abdomen first    Check your Blood Pressure   -- Sit in a chair, feet flat on the floor for 5 minutes   -- Avoid caffeine, exercise and smoking for 30 minutes before checking   -- Have your arm at the level of your heart   -- Make sure you have the correct size cuff   -- Write them down, bring your log book in to your appointment     -- Goal blood pressure 120/70   -- Learn about DASH Diet for dietary ways to reduce blood pressure  Google: NIH DASH diet  NIH site: https://www.nhlbi.nih.gov/health-topics/dash-eating-plan  PDF from [facility]: https://www.nhlbi.nih.gov/files/docs/public/heart/new_dash.pdf   -- Work on 5% weight loss   -- Daily physical exercise (eg. 30 min brisk walk)          Patient Education   Lifestyle Changes for Controlling GERD  When you have GERD, stomach acid feels as if it s backing up toward your mouth. Whether or not you take medicine to control your GERD, your symptoms can often be improved with lifestyle changes. Talk to your healthcare provider about the following suggestions. These suggestions may help you get relief from your symptoms.      Raise your head  Reflux is more likely to strike when you re lying down flat, because stomach fluid can  flow backward more easily. Raising the head of your bed 4 to 6 inches can help. To do this:  Slide blocks or books under the legs at the head of your bed. Or, place a wedge under the mattress. Many [employer] stores can make a suitable wedge for you. The wedge should run from your waist to the top of your head.  Don t just prop your head on several pillows. This increases pressure on your stomach. It can make GERD worse.  Watch your eating habits  Certain foods may increase the acid in your stomach or relax the lower esophageal sphincter. This makes GERD more likely. It s best to avoid the following if they cause you symptoms:  Coffee, tea, and carbonated drinks (with and without caffeine)  Fatty, fried, or spicy food  Mint, chocolate, onions, and tomatoes  Peppermint  Any other foods that seem to irritate your stomach or cause you pain  Relieve the pressure  Tips include the following:  Eat smaller meals, even if you have to eat more often.  Don t lie down right after you eat. Wait a few hours for your stomach to empty.  Avoid tight belts and tight-fitting clothes.  Lose excess weight.  Tobacco and alcohol  Avoid smoking tobacco and drinking alcohol. They can make GERD symptoms worse.  Date Last Reviewed: 7/1/2016 2000-2017 [employer]. [address] [place] [address], [place] [address]. All rights reserved. This information is not intended as a substitute for professional medical care. Always follow your healthcare professional's instructions.

## 2022-06-06 NOTE — LETTER
June 8, 2022      Carlton Kenney  [address]   [facility] [address]        Dear ,    We are writing to inform you of your test results.    Your lab results all returned normal. Next step is upper and lower endoscopies as discussed in clinic.    Signed, Js Patel MD, FAAP, FACP  Internal Medicine & Pediatrics      Results for orders placed or performed in visit on 06/06/22 (from the past 48 hour(s))   CBC with Platelets & Differential    Narrative    The following orders were created for panel order CBC with Platelets & Differential.  Procedure                               Abnormality         Status                     ---------                               -----------         ------                     CBC with platelets and d...[[id number]]  Normal              Final result               Manual Differential[[id number]]          Abnormal            Final result                 Please view results for these tests on the individual orders.   Comprehensive metabolic panel   Result Value Ref Range    Sodium 140 134 - 144 mmol/L    Potassium 4.9 3.5 - 5.1 mmol/L    Chloride 106 98 - 107 mmol/L    Carbon Dioxide (CO2) 29 21 - 31 mmol/L    Anion Gap 5 3 - 14 mmol/L    Urea Nitrogen 15 7 - 25 mg/dL    Creatinine 1.01 0.70 - 1.30 mg/dL    Calcium 9.6 8.6 - 10.3 mg/dL    Glucose 102 70 - 105 mg/dL    Alkaline Phosphatase 35 34 - 104 U/L    AST 16 13 - 39 U/L    ALT 17 7 - 52 U/L    Protein Total 7.2 6.4 - 8.9 g/dL    Albumin 4.5 3.5 - 5.7 g/dL    Bilirubin Total 0.5 0.3 - 1.0 mg/dL    GFR Estimate 89 >60 mL/min/1.73m2   Lipase   Result Value Ref Range    Lipase 22 11 - 82 U/L   Amylase   Result Value Ref Range    Amylase 32 29 - 103 U/L   TSH   Result Value Ref Range    TSH 2.57 0.40 - 4.00 mU/L   CRP inflammation   Result Value Ref Range    CRP Inflammation 6.3 <10.0 mg/L   Erythrocyte sedimentation rate auto   Result Value Ref Range    Erythrocyte Sedimentation Rate 4 0 - 20 mm/hr   CBC with platelets  and differential   Result Value Ref Range    WBC Count 10.4 4.0 - 11.0 10e3/uL    RBC Count 5.13 4.40 - 5.90 10e6/uL    Hemoglobin 16.4 13.3 - 17.7 g/dL    Hematocrit 47.2 40.0 - 53.0 %    MCV 92 78 - 100 fL    MCH 32.0 26.5 - 33.0 pg    MCHC 34.7 31.5 - 36.5 g/dL    RDW 12.1 10.0 - 15.0 %    Platelet Count 165 150 - 450 10e3/uL   Manual Differential   Result Value Ref Range    % Neutrophils 51 %    % Lymphocytes 47 %    % Monocytes 2 %    % Eosinophils 0 %    % Basophils 0 %    Absolute Neutrophils 5.3 1.6 - 8.3 10e3/uL    Absolute Lymphocytes 4.9 0.8 - 5.3 10e3/uL    Absolute Monocytes 0.2 0.0 - 1.3 10e3/uL    Absolute Eosinophils 0.0 0.0 - 0.7 10e3/uL    Absolute Basophils 0.0 0.0 - 0.2 10e3/uL    RBC Morphology Confirmed RBC Indices     Platelet Assessment  Automated Count Confirmed. Platelet morphology is normal.     Automated Count Confirmed. Platelet morphology is normal.    Smudge Cells Present (A) None Seen       Resulted Orders   Comprehensive metabolic panel   Result Value Ref Range    Sodium 140 134 - 144 mmol/L    Potassium 4.9 3.5 - 5.1 mmol/L    Chloride 106 98 - 107 mmol/L    Carbon Dioxide (CO2) 29 21 - 31 mmol/L    Anion Gap 5 3 - 14 mmol/L    Urea Nitrogen 15 7 - 25 mg/dL    Creatinine 1.01 0.70 - 1.30 mg/dL    Calcium 9.6 8.6 - 10.3 mg/dL    Glucose 102 70 - 105 mg/dL    Alkaline Phosphatase 35 34 - 104 U/L    AST 16 13 - 39 U/L    ALT 17 7 - 52 U/L    Protein Total 7.2 6.4 - 8.9 g/dL    Albumin 4.5 3.5 - 5.7 g/dL    Bilirubin Total 0.5 0.3 - 1.0 mg/dL    GFR Estimate 89 >60 mL/min/1.73m2      Comment:      Effective December 21, 2021 eGFRcr in adults is calculated using the 2021 CKD-EPI creatinine equation which includes age and gender (Dimitri mccarthy al., NEJM, DOI: 10.1056/RMQTvp6748076)   Lipase   Result Value Ref Range    Lipase 22 11 - 82 U/L   Amylase   Result Value Ref Range    Amylase 32 29 - 103 U/L   TSH   Result Value Ref Range    TSH 2.57 0.40 - 4.00 mU/L   CRP inflammation   Result Value  Ref Range    CRP Inflammation 6.3 <10.0 mg/L   Erythrocyte sedimentation rate auto   Result Value Ref Range    Erythrocyte Sedimentation Rate 4 0 - 20 mm/hr   CBC with platelets and differential   Result Value Ref Range    WBC Count 10.4 4.0 - 11.0 10e3/uL    RBC Count 5.13 4.40 - 5.90 10e6/uL    Hemoglobin 16.4 13.3 - 17.7 g/dL    Hematocrit 47.2 40.0 - 53.0 %    MCV 92 78 - 100 fL    MCH 32.0 26.5 - 33.0 pg    MCHC 34.7 31.5 - 36.5 g/dL    RDW 12.1 10.0 - 15.0 %    Platelet Count 165 150 - 450 10e3/uL   Manual Differential   Result Value Ref Range    % Neutrophils 51 %    % Lymphocytes 47 %    % Monocytes 2 %    % Eosinophils 0 %    % Basophils 0 %    Absolute Neutrophils 5.3 1.6 - 8.3 10e3/uL    Absolute Lymphocytes 4.9 0.8 - 5.3 10e3/uL    Absolute Monocytes 0.2 0.0 - 1.3 10e3/uL    Absolute Eosinophils 0.0 0.0 - 0.7 10e3/uL    Absolute Basophils 0.0 0.0 - 0.2 10e3/uL    RBC Morphology Confirmed RBC Indices     Platelet Assessment  Automated Count Confirmed. Platelet morphology is normal.     Automated Count Confirmed. Platelet morphology is normal.    Smudge Cells Present (A) None Seen       If you have any questions or concerns, please call the clinic at the number listed above.       Sincerely,      Js Patel MD

## 2022-06-07 ENCOUNTER — LAB (OUTPATIENT)
Dept: LAB | Facility: OTHER | Age: 52
End: 2022-06-07
Attending: INTERNAL MEDICINE
Payer: COMMERCIAL

## 2022-06-07 DIAGNOSIS — K21.00 GASTROESOPHAGEAL REFLUX DISEASE WITH ESOPHAGITIS, UNSPECIFIED WHETHER HEMORRHAGE: ICD-10-CM

## 2022-06-07 DIAGNOSIS — K52.9 CHRONIC DIARRHEA: ICD-10-CM

## 2022-06-07 DIAGNOSIS — R10.13 ABDOMINAL PAIN, EPIGASTRIC: ICD-10-CM

## 2022-06-07 LAB
C DIFF TOX B STL QL: NEGATIVE
LACTOFERRIN STL QL IA: NEGATIVE
RIBOTYPE 027/NAP1/BI: NORMAL

## 2022-06-07 PROCEDURE — 83630 LACTOFERRIN FECAL (QUAL): CPT | Mod: ZL

## 2022-06-07 PROCEDURE — 87506 IADNA-DNA/RNA PROBE TQ 6-11: CPT | Mod: ZL

## 2022-06-07 PROCEDURE — 87493 C DIFF AMPLIFIED PROBE: CPT | Mod: ZL

## 2022-06-07 PROCEDURE — 87329 GIARDIA AG IA: CPT | Mod: ZL

## 2022-06-08 ENCOUNTER — TELEPHONE (OUTPATIENT)
Dept: SURGERY | Facility: OTHER | Age: 52
End: 2022-06-08
Payer: COMMERCIAL

## 2022-06-08 LAB
C COLI+JEJUNI+LARI FUSA STL QL NAA+PROBE: NOT DETECTED
C PARVUM AG STL QL IA: NEGATIVE
EC STX1 GENE STL QL NAA+PROBE: NOT DETECTED
EC STX2 GENE STL QL NAA+PROBE: NOT DETECTED
G LAMBLIA AG STL QL IA: NEGATIVE
HCV AB SERPL QL IA: NONREACTIVE
NOROV GI+II ORF1-ORF2 JNC STL QL NAA+PR: NOT DETECTED
RVA NSP5 STL QL NAA+PROBE: NOT DETECTED
SALMONELLA SP RPOD STL QL NAA+PROBE: NOT DETECTED
SHIGELLA SP+EIEC IPAH STL QL NAA+PROBE: NOT DETECTED
V CHOL+PARA RFBL+TRKH+TNAA STL QL NAA+PR: NOT DETECTED
Y ENTERO RECN STL QL NAA+PROBE: NOT DETECTED

## 2022-06-08 NOTE — TELEPHONE ENCOUNTER
[facility] Diagnostic Referral    Patient has a referral for a EGD/Colonosopy with a diagnosis of Chronic diarrhea  Gastroesophageal reflux disease with esophagitis, unspecified whether hemorrhage.    Please advise.    Thank you,Kim Brand on 6/8/2022 at 3:33 PM

## 2022-07-05 DIAGNOSIS — Z12.11 ENCOUNTER FOR SCREENING COLONOSCOPY: Primary | ICD-10-CM

## 2022-07-05 RX ORDER — BISACODYL 5 MG
TABLET, DELAYED RELEASE (ENTERIC COATED) ORAL
Qty: 2 TABLET | Refills: 0 | Status: ON HOLD | OUTPATIENT
Start: 2022-07-05 | End: 2022-07-21

## 2022-07-05 RX ORDER — POLYETHYLENE GLYCOL 3350, SODIUM CHLORIDE, SODIUM BICARBONATE, POTASSIUM CHLORIDE 420; 11.2; 5.72; 1.48 G/4L; G/4L; G/4L; G/4L
4000 POWDER, FOR SOLUTION ORAL ONCE
Qty: 4000 ML | Refills: 0 | Status: SHIPPED | OUTPATIENT
Start: 2022-07-05 | End: 2022-07-05

## 2022-07-05 NOTE — TELEPHONE ENCOUNTER
Screening Questions for the Scheduling of Screening Colonoscopies   (If Colonoscopy is diagnostic, Provider should review the chart before scheduling.)  Are you younger than 50 or older than 80?  NO  Do you take aspirin or fish oil?  NO (if yes, tell patient to stop 1 week prior to Colonoscopy)  Do you take warfarin (Coumadin), clopidogrel (Plavix), apixaban (Eliquis), dabigatram (Pradaxa), rivaroxaban (Xarelto) or any blood thinner? NO  Do you use oxygen at home?  NO  Do you have kidney disease? NO  Are you on dialysis? NO  Have you had a stroke or heart attack in the last year? NO  Have you had a stent in your heart or any blood vessel in the last year? NO  Have you had a transplant of any organ? NO  Have you had a colonoscopy or upper endoscopy (EGD) before? YES         When?  10 YEARS GICH PER PATIENT  Date of scheduled Colonoscopy. 07/21/2022  Provider NARGIS Valdez [facility]

## 2022-07-16 ENCOUNTER — HEALTH MAINTENANCE LETTER (OUTPATIENT)
Age: 52
End: 2022-07-16

## 2022-07-21 ENCOUNTER — HOSPITAL ENCOUNTER (OUTPATIENT)
Facility: OTHER | Age: 52
Discharge: HOME | End: 2022-07-21
Attending: SURGERY | Admitting: SURGERY
Payer: COMMERCIAL

## 2022-07-21 ENCOUNTER — ANESTHESIA (OUTPATIENT)
Dept: SURGERY | Facility: OTHER | Age: 52
End: 2022-07-21
Payer: COMMERCIAL

## 2022-07-21 ENCOUNTER — ANESTHESIA EVENT (OUTPATIENT)
Dept: SURGERY | Facility: OTHER | Age: 52
End: 2022-07-21
Payer: COMMERCIAL

## 2022-07-21 VITALS
HEIGHT: 74 IN | SYSTOLIC BLOOD PRESSURE: 160 MMHG | HEART RATE: 91 BPM | TEMPERATURE: 98.3 F | RESPIRATION RATE: 20 BRPM | BODY MASS INDEX: 28.23 KG/M2 | OXYGEN SATURATION: 95 % | DIASTOLIC BLOOD PRESSURE: 83 MMHG | WEIGHT: 220 LBS

## 2022-07-21 PROCEDURE — 43239 EGD BIOPSY SINGLE/MULTIPLE: CPT | Performed by: SURGERY

## 2022-07-21 PROCEDURE — 250N000011 HC RX IP 250 OP 636: Performed by: NURSE ANESTHETIST, CERTIFIED REGISTERED

## 2022-07-21 PROCEDURE — 45380 COLONOSCOPY AND BIOPSY: CPT | Performed by: SURGERY

## 2022-07-21 PROCEDURE — 250N000009 HC RX 250: Performed by: SURGERY

## 2022-07-21 PROCEDURE — 43239 EGD BIOPSY SINGLE/MULTIPLE: CPT | Performed by: NURSE ANESTHETIST, CERTIFIED REGISTERED

## 2022-07-21 PROCEDURE — 258N000003 HC RX IP 258 OP 636: Performed by: SURGERY

## 2022-07-21 PROCEDURE — 88305 TISSUE EXAM BY PATHOLOGIST: CPT

## 2022-07-21 PROCEDURE — 258N000003 HC RX IP 258 OP 636: Performed by: NURSE ANESTHETIST, CERTIFIED REGISTERED

## 2022-07-21 RX ORDER — PROPOFOL 10 MG/ML
INJECTION, EMULSION INTRAVENOUS CONTINUOUS PRN
Status: DISCONTINUED | OUTPATIENT
Start: 2022-07-21 | End: 2022-07-21

## 2022-07-21 RX ORDER — NALOXONE HYDROCHLORIDE 0.4 MG/ML
0.4 INJECTION, SOLUTION INTRAMUSCULAR; INTRAVENOUS; SUBCUTANEOUS
Status: DISCONTINUED | OUTPATIENT
Start: 2022-07-21 | End: 2022-07-21 | Stop reason: HOSPADM

## 2022-07-21 RX ORDER — PROPOFOL 10 MG/ML
INJECTION, EMULSION INTRAVENOUS PRN
Status: DISCONTINUED | OUTPATIENT
Start: 2022-07-21 | End: 2022-07-21

## 2022-07-21 RX ORDER — NALOXONE HYDROCHLORIDE 0.4 MG/ML
0.2 INJECTION, SOLUTION INTRAMUSCULAR; INTRAVENOUS; SUBCUTANEOUS
Status: DISCONTINUED | OUTPATIENT
Start: 2022-07-21 | End: 2022-07-21 | Stop reason: HOSPADM

## 2022-07-21 RX ORDER — SODIUM CHLORIDE, SODIUM LACTATE, POTASSIUM CHLORIDE, CALCIUM CHLORIDE 600; 310; 30; 20 MG/100ML; MG/100ML; MG/100ML; MG/100ML
INJECTION, SOLUTION INTRAVENOUS CONTINUOUS
Status: DISCONTINUED | OUTPATIENT
Start: 2022-07-21 | End: 2022-07-21 | Stop reason: HOSPADM

## 2022-07-21 RX ORDER — LIDOCAINE 40 MG/G
CREAM TOPICAL
Status: DISCONTINUED | OUTPATIENT
Start: 2022-07-21 | End: 2022-07-21 | Stop reason: HOSPADM

## 2022-07-21 RX ORDER — SODIUM CHLORIDE, SODIUM LACTATE, POTASSIUM CHLORIDE, CALCIUM CHLORIDE 600; 310; 30; 20 MG/100ML; MG/100ML; MG/100ML; MG/100ML
INJECTION, SOLUTION INTRAVENOUS CONTINUOUS PRN
Status: DISCONTINUED | OUTPATIENT
Start: 2022-07-21 | End: 2022-07-21

## 2022-07-21 RX ORDER — FLUMAZENIL 0.1 MG/ML
0.2 INJECTION, SOLUTION INTRAVENOUS
Status: DISCONTINUED | OUTPATIENT
Start: 2022-07-21 | End: 2022-07-21 | Stop reason: HOSPADM

## 2022-07-21 RX ADMIN — SODIUM CHLORIDE, SODIUM LACTATE, POTASSIUM CHLORIDE, AND CALCIUM CHLORIDE: 600; 310; 30; 20 INJECTION, SOLUTION INTRAVENOUS at 09:35

## 2022-07-21 RX ADMIN — PROPOFOL 190 MCG/KG/MIN: 10 INJECTION, EMULSION INTRAVENOUS at 09:39

## 2022-07-21 RX ADMIN — PROPOFOL 150 MG: 10 INJECTION, EMULSION INTRAVENOUS at 09:39

## 2022-07-21 RX ADMIN — SODIUM CHLORIDE, POTASSIUM CHLORIDE, SODIUM LACTATE AND CALCIUM CHLORIDE 30 ML/HR: 600; 310; 30; 20 INJECTION, SOLUTION INTRAVENOUS at 08:59

## 2022-07-21 ASSESSMENT — LIFESTYLE VARIABLES: TOBACCO_USE: 1

## 2022-07-21 NOTE — ANESTHESIA PREPROCEDURE EVALUATION
Anesthesia Pre-Procedure Evaluation    Patient: Devon Kenney   MRN: [id number] : 1970        Procedure : Procedure(s):  ESOPHAGOGASTRODUODENOSCOPY (EGD)  COLONOSCOPY          Past Medical History:   Diagnosis Date     Closed fracture of left forearm     with electricity at work     Contusion of right foot     ,secondary to dropping fire wood on it x 2.     Encounter for other administrative examinations     No Comments Provided      Past Surgical History:   Procedure Laterality Date     COLONOSCOPY      10/2012,Tubular adenoma at 35 cm - follow up 5 years     ESOPHAGOSCOPY, GASTROSCOPY, DUODENOSCOPY (EGD), COMBINED      10/2012,reflux changes; gastric biopsies negative     OTHER SURGICAL HISTORY      [id number],OTHER      No Known Allergies   Social History     Tobacco Use     Smoking status: Former Smoker     Packs/day: 2.00     Years: 18.00     Pack years: 36.00     Types: Cigarettes     Quit date: 10/15/2006     Years since quitting: 15.7     Smokeless tobacco: Never Used   Substance Use Topics     Alcohol use: Yes     Alcohol/week: 2.0 standard drinks     Types: 2 Cans of beer per week      Wt Readings from Last 1 Encounters:   22 99.8 kg (220 lb)        Anesthesia Evaluation   Pt has had prior anesthetic.     No history of anesthetic complications       ROS/MED HX  ENT/Pulmonary:     (+) tobacco use, Past use,     Neurologic:  - neg neurologic ROS     Cardiovascular:       METS/Exercise Tolerance: >4 METS    Hematologic:  - neg hematologic  ROS     Musculoskeletal:  - neg musculoskeletal ROS     GI/Hepatic:     (+) GERD, Asymptomatic on medication,     Renal/Genitourinary:  - neg Renal ROS     Endo:  - neg endo ROS     Psychiatric/Substance Use:  - neg psychiatric ROS     Infectious Disease:  - neg infectious disease ROS     Malignancy:  - neg malignancy ROS     Other:  - neg other ROS          Physical Exam    Airway  airway exam normal    Comment: Full Beard    Mallampati: II   TM distance:  > 3 FB   Neck ROM: full   Mouth opening: > 3 cm    Respiratory Devices and Support         Dental  no notable dental history         Cardiovascular   cardiovascular exam normal       Rhythm and rate: regular and normal     Pulmonary   pulmonary exam normal        breath sounds clear to auscultation           OUTSIDE LABS:  CBC:   Lab Results   Component Value Date    WBC 10.4 06/06/2022    HGB 16.4 06/06/2022    HGB 15.9 07/15/2013    HCT 47.2 06/06/2022    HCT 48.0 07/15/2013     06/06/2022     (L) 07/15/2013     BMP:   Lab Results   Component Value Date     06/06/2022    POTASSIUM 4.9 06/06/2022    CHLORIDE 106 06/06/2022    CO2 29 06/06/2022    BUN 15 06/06/2022    CR 1.01 06/06/2022     06/06/2022     COAGS: No results found for: PTT, INR, FIBR  POC: No results found for: BGM, HCG, HCGS  HEPATIC:   Lab Results   Component Value Date    ALBUMIN 4.5 06/06/2022    PROTTOTAL 7.2 06/06/2022    ALT 17 06/06/2022    AST 16 06/06/2022    ALKPHOS 35 06/06/2022    BILITOTAL 0.5 06/06/2022     OTHER:   Lab Results   Component Value Date    RENE 9.6 06/06/2022    LIPASE 22 06/06/2022    AMYLASE 32 06/06/2022    TSH 2.57 06/06/2022    CRP 6.3 06/06/2022    SED 4 06/06/2022       Anesthesia Plan    ASA Status:  2   NPO Status:  NPO Appropriate    Anesthesia Type: MAC.     - Reason for MAC: straight local not clinically adequate   Induction: Propofol.           Consents    Anesthesia Plan(s) and associated risks, benefits, and realistic alternatives discussed. Questions answered and patient/representative(s) expressed understanding.     - Discussed: Risks, Benefits and Alternatives for BOTH SEDATION and the PROCEDURE were discussed     - Discussed with:  Patient      - Extended Intubation/Ventilatory Support Discussed: No.      - Patient is DNR/DNI Status: No    Use of blood products discussed: Yes.     - Discussed with: Patient.     - Consented: consented to blood products            Reason for  refusal: other.     Postoperative Care            Comments:                WENDY SOLANO CRNA

## 2022-07-21 NOTE — H&P
CHIEF COMPLAINT / REASON FOR PROCEDURE: GERD diarrhea    PERTINENT HISTORY   Patient complains of GERD, diarrhea. Previous colonoscopy 2012, previous EGD 2012. No family history of colon polyps or colon cancer.    Past Medical History:   Diagnosis Date     Closed fracture of left forearm     with electricity at work     Contusion of right foot     03/09,secondary to dropping fire wood on it x 2.     Encounter for other administrative examinations     No Comments Provided     Past Surgical History:   Procedure Laterality Date     COLONOSCOPY      10/2012,Tubular adenoma at 35 cm - follow up 5 years     ESOPHAGOSCOPY, GASTROSCOPY, DUODENOSCOPY (EGD), COMBINED      10/2012,reflux changes; gastric biopsies negative     OTHER SURGICAL HISTORY      [id number],OTHER     Other:  None  Bleeding tendencies: None    Relevant Family History: None    Relevant Social History: None    A relevant review of systems was performed and was negative. See HPI.    ALLERGIES/SENSITIVITIES: No Known Allergies     CURRENT MEDICATIONS: No current facility-administered medications on file prior to encounter.  famotidine (PEPCID) 20 MG tablet, Take 1 tablet (20 mg) by mouth 2 times daily as needed (GERD)  omeprazole (PRILOSEC) 20 MG DR capsule, Take 1 capsule (20 mg) by mouth 2 times daily  sucralfate (CARAFATE) 1 GM tablet, Take 1 tablet (1 g) by mouth 4 times daily        PRE-SEDATION ASSESSMENT:    Lung Exam:  CTAB  Heart Exam:  RRR    Comment(s):      IMPRESSION: 52-year-old male with diarrhea and GERD..    PLAN:  I discussed diagnostic EGD and colonoscopy with the patient. Anesthesia coverage requested    Melecio Alicea MD

## 2022-07-21 NOTE — ANESTHESIA CARE TRANSFER NOTE
Patient: Devon Kenney    Procedure: Procedure(s):  ESOPHAGOGASTRODUODENOSCOPY, WITH BIOPSY  COLONOSCOPY, WITH POLYPECTOMY AND BIOPSY       Diagnosis: GERD (gastroesophageal reflux disease) [K21.9]  Diarrhea [R19.7]  Diagnosis Additional Information: No value filed.    Anesthesia Type:   MAC     Note:      Level of Consciousness: awake  Oxygen Supplementation: nasal cannula  Level of Supplemental Oxygen (L/min / FiO2): 4 L/min  Independent Airway: airway patency satisfactory and stable  Dentition: dentition unchanged  Vital Signs Stable: post-procedure vital signs reviewed and stable  Report to RN Given: handoff report given  Patient transferred to: Phase II    Handoff Report: Identifed the Patient, Identified the Reponsible Provider, Reviewed the pertinent medical history, Discussed the surgical course, Reviewed Intra-OP anesthesia mangement and issues during anesthesia, Set expectations for post-procedure period and Allowed opportunity for questions and acknowledgement of understanding      Vitals:  Vitals Value Taken Time   BP     Temp     Pulse     Resp     SpO2 96 % 07/21/22 1026   Vitals shown include unvalidated device data.    Electronically Signed By: David Kellerman, APRN CRNA  July 21, 2022  10:28 AM

## 2022-07-21 NOTE — ANESTHESIA POSTPROCEDURE EVALUATION
Patient: Devon Kenney    Procedure: Procedure(s):  ESOPHAGOGASTRODUODENOSCOPY, WITH BIOPSY  COLONOSCOPY, WITH POLYPECTOMY AND BIOPSY       Anesthesia Type:  MAC    Note:  Disposition: Outpatient   Postop Pain Control: Uneventful            Sign Out: Well controlled pain   PONV: No   Neuro/Psych: Uneventful            Sign Out: Acceptable/Baseline neuro status   Airway/Respiratory: Uneventful            Sign Out: Acceptable/Baseline resp. status   CV/Hemodynamics: Uneventful            Sign Out: Acceptable CV status; No obvious hypovolemia; No obvious fluid overload   Other NRE: NONE   DID A NON-ROUTINE EVENT OCCUR? No           Last vitals:  Vitals Value Taken Time   /83 07/21/22 1030   Temp     Pulse 91 07/21/22 1030   Resp 20 07/21/22 1030   SpO2 95 % 07/21/22 1030       Electronically Signed By: WENDY SOLANO CRNA  July 21, 2022  12:24 PM

## 2022-07-21 NOTE — DISCHARGE INSTRUCTIONS
[facility]  Adult Discharge Orders & Instructions    ________________________________________________________________          For 12 hours after surgery  Get plenty of rest.  A responsible adult must stay with you for at least 12 hours after you leave the hospital.   You may feel lightheaded.  IF so, sit for a few minutes before standing.  Have someone help you get up.   You may have a slight fever. Call the doctor if your fever is over 101 F (38.3 C) (taken under the tongue) or lasts longer than 24 hours.  You may have a dry mouth, a sore throat, muscle aches or trouble sleeping.  These should go away after 24 hours.  Do not make important or legal decisions.  6.   Do not drive or use heavy equipment.  If you have weakness or tingling, don't drive or use heavy equipment until this feeling goes away.    To contact a doctor, call   [phone]_______________________

## 2022-07-21 NOTE — OP NOTE
PROCEDURE NOTE    SURGEON:Melecio Alicea MD    PRE-OP DIAGNOSIS:   GERD, diarrhea      POST-OP DIAGNOSIS: GERD, diarrhea, sliding hiatal hernia, possible Starks's, colon polyps    PROCEDURE PLANNED:   Diagnostic EGD      Diagnostic Colonoscopy    PROCEDURE PERFORMED:  Flexible EGD with biopsies, colonoscopy with cold forceps and biopsies    SPECIMEN:      ID Type Source Tests Collected by Time Destination   1 :  Biopsy Small Intestine, Duodenum SURGICAL PATHOLOGY EXAM Melecio Alicea MD 7/21/2022  9:45 AM    2 :  Biopsy Stomach, Antrum SURGICAL PATHOLOGY EXAM Melecio Alicea MD 7/21/2022  9:46 AM    3 :  Biopsy Gastric Esophageal Junction SURGICAL PATHOLOGY EXAM Melecio Alicea MD 7/21/2022  9:47 AM    4 :  Biopsy Small Intestine, Terminal Ileum SURGICAL PATHOLOGY EXAM Melecio Alicea MD 7/21/2022 10:03 AM    5 : RANDOM COLON BIOPSY Biopsy Large Intestine, Colon SURGICAL PATHOLOGY EXAM Melecio Alicea MD 7/21/2022 10:05 AM    6 :  Polyp Large Intestine, Colon, Descending SURGICAL PATHOLOGY EXAM Melecio Alicea MD 7/21/2022 10:09 AM          ANESTHESIA:  Monitor Anesthesia Care  CRNA Independent: Kellerman, David, APRN CRNA  Anesthesia coverage requested     ESTIMATED BLOOD LOSS: none    COMPLICATIONS:  None    INDICATION FOR THE PROCEDURE: The patient is a 52 year old male. The patient presents with GERD and diarrhea. I explained to the patient the risks, benefits and alternatives to diagnostic EGD and colonoscopy for evaluating GERD and diarrhea. We specifically discussed the risks of bleeding, infection, perforation, potential inability to reach the cecum and the risks of sedation. The patient's questions were answered and the patient wished to proceed. Informed consent paperwork was completed.    PROCEDURE: The patient was taken to the endoscopy suite. Appropriate monitors were attached.  The patient was placed in the left lateral decubitus position. Bite block was positioned.Timeout was performed  confirming the patient's identity and procedure to be performed.  After appropriate sedation was confirmed, the flexible endoscope was advanced into the oropharynx. The posterior oropharynx appeared grossly normal. The scope was advanced into the proximal esophagus. The esophagus was insufflated with air. The scope was advanced under direct visualization. No acute abnormalities of the esophagus were noted. The scope was advanced into the stomach. The scope was advanced through the pylorus into the duodenal bulb. The bulb and distal duodenum appeared grossly normal. The scope was withdrawn back into the stomach. Antral biopsy was obtained and sent to pathology. The scope was retroflexed and the GE junction inspected.  A sliding hiatal hernia was noted. The scope was returned to a neutral position and the stomach was decompressed. The scope was withdrawnto the GE junction and biopsy obtained.  The Z-line had LA grade C change.  The hiatal hernia measured 2 cm.  The mucosa of the esophagus was inspected while withdrawing the scope. No abnormalities were noted. The scope was withdrawn from the patient. The bite block was removed.    The patient was repositioned. After appropriate sedation, digital rectal exam was performed.  There was normal tone and no gross abnormality was noted.  The lubricated flexible colonoscope was introduced into the anus the colonwas insufflated with air. The prep quality was adequate. Under direct visualization the scope was advanced to the cecum. The ileocecal valve was intubated and the terminal ileum inspected. No gross abnormality was noted.  Biopsies were taken.  The scope was withdrawn back into the cecum.  Random colon biopsies were taken.  The mucosa of colon was inspected while withdrawing the scope.  In the descending colon 3 small polyps removed with forceps polypectomy.  These measured 2 to 3 mm.  The scope was retroflexed in the rectum and the anorectal junction was inspected. No  abnormalities were noted. The scope was returned to a neutral position and the colon was decompressed. The scope was removed. The patient tolerated the procedure with no immediately apparent complication. The patient was taken to recovery in stable condition.    FOLLOW UP:  RECOMMENDATIONS follow-up pathology, already on rather intensive therapy for GERD    Melecio Alicea MD     CC: Js Patel

## 2022-07-25 ENCOUNTER — TELEPHONE (OUTPATIENT)
Dept: PEDIATRICS | Facility: OTHER | Age: 52
End: 2022-07-25

## 2022-07-25 LAB
PATH REPORT.COMMENTS IMP SPEC: NORMAL
PATH REPORT.FINAL DX SPEC: NORMAL
PATH REPORT.RELEVANT HX SPEC: NORMAL
PHOTO IMAGE: NORMAL

## 2022-08-23 ENCOUNTER — OFFICE VISIT (OUTPATIENT)
Dept: PEDIATRICS | Facility: OTHER | Age: 52
End: 2022-08-23
Attending: INTERNAL MEDICINE
Payer: COMMERCIAL

## 2022-08-23 VITALS
HEART RATE: 80 BPM | DIASTOLIC BLOOD PRESSURE: 86 MMHG | OXYGEN SATURATION: 98 % | WEIGHT: 222.7 LBS | TEMPERATURE: 98 F | RESPIRATION RATE: 18 BRPM | SYSTOLIC BLOOD PRESSURE: 144 MMHG | BODY MASS INDEX: 28.59 KG/M2

## 2022-08-23 DIAGNOSIS — K44.9 HIATAL HERNIA: ICD-10-CM

## 2022-08-23 DIAGNOSIS — K21.00 GASTROESOPHAGEAL REFLUX DISEASE WITH ESOPHAGITIS WITHOUT HEMORRHAGE: Primary | ICD-10-CM

## 2022-08-23 PROCEDURE — 99213 OFFICE O/P EST LOW 20 MIN: CPT | Performed by: INTERNAL MEDICINE

## 2022-08-23 ASSESSMENT — PAIN SCALES - GENERAL: PAINLEVEL: NO PAIN (0)

## 2022-08-23 NOTE — NURSING NOTE
"Chief Complaint   Patient presents with     RECHECK       Initial BP (!) 148/86   Pulse 80   Temp 98  F (36.7  C) (Tympanic)   Resp 18   Wt 101 kg (222 lb 11.2 oz)   SpO2 98%   BMI 28.59 kg/m   Estimated body mass index is 28.59 kg/m  as calculated from the following:    Height as of 7/21/22: 1.88 m (6' 2\").    Weight as of this encounter: 101 kg (222 lb 11.2 oz).  Medication Reconciliation: complete    FOOD SECURITY SCREENING QUESTIONS  Hunger Vital Signs:  Within the past 12 months we worried whether our food would run out before we got money to buy more. Never  Within the past 12 months the food we bought just didn't last and we didn't have money to get more. Never  Cora Graham LPN 8/23/2022 9:04 AM    Do you have a healthcare directive? No        "

## 2022-08-23 NOTE — PROGRESS NOTES
Assessment & Plan   1. Gastroesophageal reflux disease with esophagitis without hemorrhage  2. Hiatal hernia  I think his symptoms are a combination of gastroesophageal reflux disease/gastritis with the sliding hiatal hernia.  Since his symptoms appear to be well controlled at this time recommend continuation of his medication.  Try to wean off Pepcid to as needed when able.  Consider return to general surgery for hiatal hernia repair versus referral to gastroenterology for expert consultation if symptoms persist or worsen.    Signed, Js Patel MD, FAAP, FACP  Internal Medicine & Pediatrics    Subjective   Devon Kenney is a 52 year old male who presents for follow-up after test.  Our last visit was spent discussing significant abdominal discomfort.  He was referred for EGD which was performed by Dr. Alicea.  He is feeling a lot better at this time.  He thinks that the sucralfate helps the most.  He has been taking omeprazole and famotidine scheduled as well.    Objective   Vitals: BP (!) 144/86   Pulse 80   Temp 98  F (36.7  C) (Tympanic)   Resp 18   Wt 101 kg (222 lb 11.2 oz)   SpO2 98%   BMI 28.59 kg/m        Review and Analysis of Data   I personally reviewed the following:  External notes: No  Results: Yes EGD report, pathology report reviewed with patient today  Use of an independent historian: Yes Spoke to his spouse  Independent review of a test performed by another physician: No  Discussion of management with another physician: No  Moderate risk of morbidity from additional diagnostic testing and/or treatment.

## 2022-09-17 ENCOUNTER — HEALTH MAINTENANCE LETTER (OUTPATIENT)
Age: 52
End: 2022-09-17

## 2023-01-20 DIAGNOSIS — K52.9 CHRONIC DIARRHEA: ICD-10-CM

## 2023-01-20 DIAGNOSIS — R10.13 ABDOMINAL PAIN, EPIGASTRIC: ICD-10-CM

## 2023-01-20 DIAGNOSIS — K21.00 GASTROESOPHAGEAL REFLUX DISEASE WITH ESOPHAGITIS, UNSPECIFIED WHETHER HEMORRHAGE: ICD-10-CM

## 2023-06-03 DIAGNOSIS — R10.13 ABDOMINAL PAIN, EPIGASTRIC: ICD-10-CM

## 2023-06-03 DIAGNOSIS — K21.00 GASTROESOPHAGEAL REFLUX DISEASE WITH ESOPHAGITIS, UNSPECIFIED WHETHER HEMORRHAGE: ICD-10-CM

## 2023-06-03 DIAGNOSIS — K52.9 CHRONIC DIARRHEA: ICD-10-CM

## 2023-06-06 RX ORDER — FAMOTIDINE 20 MG/1
TABLET, FILM COATED ORAL
Qty: 180 TABLET | Refills: 0 | Status: SHIPPED | OUTPATIENT
Start: 2023-06-06 | End: 2023-09-06

## 2023-06-06 NOTE — TELEPHONE ENCOUNTER
[facility] sent Rx request for the following:      Requested Prescriptions   Pending Prescriptions Disp Refills     famotidine (PEPCID) 20 MG tablet [Pharmacy Med Name: FAMOTIDINE 20MG TABLETS] 180 tablet 3     Sig: TAKE 1 TABLET(20 MG) BY MOUTH TWICE DAILY AS NEEDED FOR GERD   Last Prescription Date:   6/6/22  Last Fill Qty/Refills:         180, R-3    Last Office Visit:              8/23/22   Future Office visit:           None    Aviva Burton RN .............. 6/6/2023  10:06 AM

## 2023-07-29 ENCOUNTER — HEALTH MAINTENANCE LETTER (OUTPATIENT)
Age: 53
End: 2023-07-29

## 2023-09-04 DIAGNOSIS — K21.00 GASTROESOPHAGEAL REFLUX DISEASE WITH ESOPHAGITIS, UNSPECIFIED WHETHER HEMORRHAGE: ICD-10-CM

## 2023-09-04 DIAGNOSIS — R10.13 ABDOMINAL PAIN, EPIGASTRIC: ICD-10-CM

## 2023-09-04 DIAGNOSIS — K52.9 CHRONIC DIARRHEA: ICD-10-CM

## 2023-09-06 RX ORDER — FAMOTIDINE 20 MG/1
TABLET, FILM COATED ORAL
Qty: 180 TABLET | Refills: 0 | Status: SHIPPED | OUTPATIENT
Start: 2023-09-06 | End: 2023-11-06

## 2023-09-06 NOTE — TELEPHONE ENCOUNTER
[facility] sent Rx request for the following:      Requested Prescriptions   Pending Prescriptions Disp Refills    famotidine (PEPCID) 20 MG tablet [Pharmacy Med Name: FAMOTIDINE 20MG TABLETS] 180 tablet 0     Sig: TAKE 1 TABLET(20 MG) BY MOUTH TWICE DAILY AS NEEDED FOR GERD       H2 Blockers Protocol Failed - 9/6/2023 10:09 AM        Failed - Recent (12 mo) or future (30 days) visit within the authorizing provider's specialty     Last Prescription Date:   6/6/23  Last Fill Qty/Refills:         180, R-0    Last Office Visit:              8/23/22   Future Office visit:           None    Pt due for annual exam. Routing to provider for refill consideration. Routing to Unit scheduling pool, to assist Pt in scheduling appointment.     Aviva Burton RN .............. 9/6/2023  10:10 AM

## 2023-11-05 ASSESSMENT — ENCOUNTER SYMPTOMS
CONSTIPATION: 0
DIZZINESS: 0
MYALGIAS: 0
HEARTBURN: 1
PALPITATIONS: 0
HEMATOCHEZIA: 0
COUGH: 0
NERVOUS/ANXIOUS: 0
HEMATURIA: 0
CHILLS: 0
SORE THROAT: 0
WEAKNESS: 0
ARTHRALGIAS: 0
HEADACHES: 0
JOINT SWELLING: 0
FREQUENCY: 0
NAUSEA: 0
DYSURIA: 0
PARESTHESIAS: 0
EYE PAIN: 0
ABDOMINAL PAIN: 1
SHORTNESS OF BREATH: 0
DIARRHEA: 0
FEVER: 0

## 2023-11-06 ENCOUNTER — OFFICE VISIT (OUTPATIENT)
Dept: PEDIATRICS | Facility: OTHER | Age: 53
End: 2023-11-06
Payer: COMMERCIAL

## 2023-11-06 VITALS
SYSTOLIC BLOOD PRESSURE: 144 MMHG | HEART RATE: 84 BPM | BODY MASS INDEX: 29.85 KG/M2 | OXYGEN SATURATION: 98 % | HEIGHT: 74 IN | WEIGHT: 232.6 LBS | RESPIRATION RATE: 16 BRPM | TEMPERATURE: 97.9 F | DIASTOLIC BLOOD PRESSURE: 100 MMHG

## 2023-11-06 DIAGNOSIS — I10 WHITE COAT SYNDROME WITH HYPERTENSION: ICD-10-CM

## 2023-11-06 DIAGNOSIS — Z00.00 ANNUAL PHYSICAL EXAM: Primary | ICD-10-CM

## 2023-11-06 DIAGNOSIS — K21.00 GASTROESOPHAGEAL REFLUX DISEASE WITH ESOPHAGITIS, UNSPECIFIED WHETHER HEMORRHAGE: ICD-10-CM

## 2023-11-06 PROCEDURE — 99396 PREV VISIT EST AGE 40-64: CPT | Performed by: INTERNAL MEDICINE

## 2023-11-06 RX ORDER — FAMOTIDINE 20 MG/1
20 TABLET, FILM COATED ORAL 2 TIMES DAILY PRN
Qty: 180 TABLET | Refills: 4 | Status: SHIPPED | OUTPATIENT
Start: 2023-11-06

## 2023-11-06 ASSESSMENT — ENCOUNTER SYMPTOMS
DIZZINESS: 0
SHORTNESS OF BREATH: 0
CONSTIPATION: 0
DYSURIA: 0
JOINT SWELLING: 0
ABDOMINAL PAIN: 1
WEAKNESS: 0
HEARTBURN: 1
NAUSEA: 0
HEMATURIA: 0
NERVOUS/ANXIOUS: 0
HEADACHES: 0
ARTHRALGIAS: 0
DIARRHEA: 0
PARESTHESIAS: 0
FREQUENCY: 0
COUGH: 0
PALPITATIONS: 0
FEVER: 0
CHILLS: 0
HEMATOCHEZIA: 0
SORE THROAT: 0
MYALGIAS: 0
EYE PAIN: 0

## 2023-11-06 ASSESSMENT — PAIN SCALES - GENERAL: PAINLEVEL: NO PAIN (0)

## 2023-11-06 NOTE — PATIENT INSTRUCTIONS
-- Check BP daily for 1-2 weeks   -- Send BP log to Dr. Patel in clinic     -- You should get: covid, flu, tetanus      Check your Blood Pressure   -- Sit in a chair, feet flat on the floor for 5 minutes   -- Avoid caffeine, exercise and smoking for 30 minutes before checking   -- Have your arm at the level of your heart   -- Make sure you have the correct size cuff   -- Write them down, bring your log book in to your appointment   -- Goal blood pressure 120/70     -- Learn about DASH Diet for dietary ways to reduce blood pressure  Google: [facility] DASH diet  [facility] site: https://www.nhlbi.nih.gov/health-topics/dash-eating-plan  PDF from [facility]: https://www.nhlbi.nih.gov/files/docs/public/heart/new_dash.pdf    What should I do?   -- Reduce salt intake (box, can, package, restaurant, salt shaker)   -- Reduce caffeine   -- Reduce alcohol   -- Work on 5% weight loss   -- Daily physical exercise (American Heart recommends 30-45 minutes of brisk walking 5 days per week)      Your BMI is Body mass index is 30.27 kg/m .  (BMI ranges: Normal 18.5 - 25, Overweight 25 - 30, Obesity greater than 30,     Morbid Obesity greater than 40 or greater than 35 with associated conditions.)    Facts about losing weight:   -- Overweight and Obesity increase your risk for developing diabetes, high blood pressure and stroke, and shorten your life.   -- 90% of weight loss comes from dietary changes, only 10% from exercise    What should I do?   -- Work on 5-10% weight loss   -- Focus on a few healthy dietary changes   -- Eat more fresh fruits and vegetables, and fewer carbohydrates   -- Cut out all calorie-containing beverages (milk, juice, alcohol, etc)   -- Exercise every day   -- Weigh yourself once a week   -- Learn about DASH Diet for dietary ways to reduce blood pressure  Google: [facility] DASH diet  [facility] site: https://www.nhlbi.nih.gov/health-topics/dash-eating-plan  PDF from [facility]: https://www.nhlbi.nih.gov/files/docs/public/heart/new_dash.pdf   --  Consider Weight Watchers (http://www.weightSammy's great American bar.com)   -- Consider My Fitness Pal (iOS, Android, http://www.Meaningfypal.com)   -- Consider time-restricted eating (eg. eating between noon and 8pm only)

## 2023-11-06 NOTE — NURSING NOTE
"Chief Complaint   Patient presents with    Physical     annual       Initial BP (!) 150/90   Pulse 84   Temp 97.9  F (36.6  C) (Tympanic)   Resp 16   Ht 1.867 m (6' 1.5\")   Wt 105.5 kg (232 lb 9.6 oz)   SpO2 98%   BMI 30.27 kg/m   Estimated body mass index is 30.27 kg/m  as calculated from the following:    Height as of this encounter: 1.867 m (6' 1.5\").    Weight as of this encounter: 105.5 kg (232 lb 9.6 oz).  Medication Review: complete    The next two questions are to help us understand your food security.  If you are feeling you need any assistance in this area, we have resources available to support you today.          11/5/2023   SDOH- Food Insecurity   Within the past 12 months, did you worry that your food would run out before you got money to buy more? N   Within the past 12 months, did the food you bought just not last and you didn t have money to get more? N         Health Care Directive:  Patient does not have a Health Care Directive or Living Will: Discussed advance care planning with patient; information given to patient to review.    Norma J. Gosselin, LPN      "

## 2023-11-06 NOTE — PROGRESS NOTES
"SUBJECTIVE:   CC: Carlton is an 53 year old who presents for preventative health visit.     He is feeling really anxious today.  He always has an elevated blood pressure walking into the clinic.  He would rather not get any blood drawn.    Healthy Habits:     Getting at least 3 servings of Calcium per day:  Yes    Bi-annual eye exam:  NO    Dental care twice a year:  Yes    Sleep apnea or symptoms of sleep apnea:  None    Diet:  Regular (no restrictions)    Frequency of exercise:  None    Taking medications regularly:  Yes    Additional concerns today:  No      Today's PHQ-2 Score:       2023     7:30 AM   PHQ-2 (  [employer])   Q1: Little interest or pleasure in doing things 0   Q2: Feeling down, depressed or hopeless 0   PHQ-2 Score 0   Q1: Little interest or pleasure in doing things Not at all   Q2: Feeling down, depressed or hopeless Not at all   PHQ-2 Score 0                   Have you ever done Advance Care Planning? (For example, a Health Directive, POLST, or a discussion with a medical provider or your loved ones about your wishes):     Social History     Tobacco Use    Smoking status: Former     Packs/day: 2.00     Years: 18.00     Additional pack years: 0.00     Total pack years: 36.00     Types: Cigarettes     Quit date: 10/15/2006     Years since quittin.0    Smokeless tobacco: Never   Substance Use Topics    Alcohol use: Yes     Alcohol/week: 2.0 standard drinks of alcohol     Types: 2 Cans of beer per week             2023     7:30 AM   Alcohol Use   Prescreen: >3 drinks/day or >7 drinks/week? Not Applicable       Last PSA: No results found for: \"PSA\"    Reviewed orders with patient. Reviewed health maintenance and updated orders accordingly - Yes      Reviewed and updated as needed this visit by clinical staff   Tobacco  Allergies  Meds   Med Hx  Surg Hx  Fam Hx  Soc Hx        Reviewed and updated as needed this visit by Provider                     Review of Systems   Constitutional: " " Negative for chills and fever.   HENT:  Positive for hearing loss. Negative for congestion, ear pain and sore throat.    Eyes:  Negative for pain and visual disturbance.   Respiratory:  Negative for cough and shortness of breath.    Cardiovascular:  Negative for chest pain, palpitations and peripheral edema.   Gastrointestinal:  Positive for abdominal pain and heartburn. Negative for constipation, diarrhea, hematochezia and nausea.   Genitourinary:  Negative for dysuria, frequency, genital sores, hematuria, impotence, penile discharge and urgency.   Musculoskeletal:  Negative for arthralgias, joint swelling and myalgias.   Skin:  Negative for rash.   Neurological:  Negative for dizziness, weakness, headaches and paresthesias.   Psychiatric/Behavioral:  Negative for mood changes. The patient is not nervous/anxious.          OBJECTIVE:   BP (!) 144/100   Pulse 84   Temp 97.9  F (36.6  C) (Tympanic)   Resp 16   Ht 1.867 m (6' 1.5\")   Wt 105.5 kg (232 lb 9.6 oz)   SpO2 98%   BMI 30.27 kg/m      Physical Exam  Gen: Alert, NAD.  Neck: No carotid bruits  CV: RRR no m/r/g  Pulm: CTAB, no w/r/r. No increased work of breathing  Msk: No LE edema  Neuro: Grossly intact  Skin: No concerning lesions.  Psychiatric: Normal affect and insight. Does not appear anxious or depressed.      Diagnostic Test Results:  Labs reviewed in Epic    ASSESSMENT/PLAN:       ICD-10-CM    1. Annual physical exam  Z00.00       2. White coat syndrome with hypertension  I10 Miscellaneous Order for DME - ONLY FOR DME      3. Gastroesophageal reflux disease with esophagitis, unspecified whether hemorrhage  K21.00 famotidine (PEPCID) 20 MG tablet        Patient Instructions    -- Check BP daily for 1-2 weeks   -- Send BP log to Dr. Patel in clinic     -- You should get: covid, flu, tetanus      Check your Blood Pressure   -- Sit in a chair, feet flat on the floor for 5 minutes   -- Avoid caffeine, exercise and smoking for 30 minutes before " checking   -- Have your arm at the level of your heart   -- Make sure you have the correct size cuff   -- Write them down, bring your log book in to your appointment   -- Goal blood pressure 120/70     -- Learn about DASH Diet for dietary ways to reduce blood pressure  Google: [facility] DASH diet  [facility] site: https://www.Pending sale to [facility] Healthbi.nih.gov/health-topics/dash-eating-plan  PDF from [facility]: https://www.nhlbi.nih.gov/files/docs/public/heart/new_dash.pdf    What should I do?   -- Reduce salt intake (box, can, package, restaurant, salt shaker)   -- Reduce caffeine   -- Reduce alcohol   -- Work on 5% weight loss   -- Daily physical exercise (American Heart recommends 30-45 minutes of brisk walking 5 days per week)      Your BMI is Body mass index is 30.27 kg/m .  (BMI ranges: Normal 18.5 - 25, Overweight 25 - 30, Obesity greater than 30,     Morbid Obesity greater than 40 or greater than 35 with associated conditions.)    Facts about losing weight:   -- Overweight and Obesity increase your risk for developing diabetes, high blood pressure and stroke, and shorten your life.   -- 90% of weight loss comes from dietary changes, only 10% from exercise    What should I do?   -- Work on 5-10% weight loss   -- Focus on a few healthy dietary changes   -- Eat more fresh fruits and vegetables, and fewer carbohydrates   -- Cut out all calorie-containing beverages (milk, juice, alcohol, etc)   -- Exercise every day   -- Weigh yourself once a week   -- Learn about DASH Diet for dietary ways to reduce blood pressure  Google: [facility] DASH diet  [facility] site: https://www.nhlbi.nih.gov/health-topics/dash-eating-plan  PDF from [facility]: https://www.nhlbi.nih.gov/files/docs/public/heart/new_dash.pdf   -- Consider Weight Watchers (http://www.weightwatchers.com)   -- Consider My Fitness Pal (iOS, Android, http://www.AeroFS.com)   -- Consider time-restricted eating (eg. eating between noon and 8pm only)        Js Dawson MD, FAAP, FACP  Internal Medicine &  "Pediatrics            COUNSELING:   Reviewed preventive health counseling, as reflected in patient instructions      BMI:   Estimated body mass index is 30.27 kg/m  as calculated from the following:    Height as of this encounter: 1.867 m (6' 1.5\").    Weight as of this encounter: 105.5 kg (232 lb 9.6 oz).   Weight management plan: Discussed healthy diet and exercise guidelines      He reports that he quit smoking about 17 years ago. His smoking use included cigarettes. He has a 36.00 pack-year smoking history. He has never used smokeless tobacco.            Js Patel MD  [facility]  "

## 2024-12-09 NOTE — NURSING NOTE
Pt here with his wife for stomach issues for about a year.  Lots of rumbling and pain and diarrhea typically once a day after breakfast.    Kasia Villarreal CMA ([facility])......................6/6/2022  8:54 AM       Medication Reconciliation: complete    Kasia Villarreal CMA  6/6/2022 8:54 AM   
Parent

## 2025-01-12 ENCOUNTER — HEALTH MAINTENANCE LETTER (OUTPATIENT)
Age: 55
End: 2025-01-12

## 2025-02-19 SDOH — HEALTH STABILITY: PHYSICAL HEALTH: ON AVERAGE, HOW MANY MINUTES DO YOU ENGAGE IN EXERCISE AT THIS LEVEL?: 20 MIN

## 2025-02-19 SDOH — HEALTH STABILITY: PHYSICAL HEALTH: ON AVERAGE, HOW MANY DAYS PER WEEK DO YOU ENGAGE IN MODERATE TO STRENUOUS EXERCISE (LIKE A BRISK WALK)?: 2 DAYS

## 2025-02-19 ASSESSMENT — SOCIAL DETERMINANTS OF HEALTH (SDOH): HOW OFTEN DO YOU GET TOGETHER WITH FRIENDS OR RELATIVES?: MORE THAN THREE TIMES A WEEK

## 2025-02-24 ENCOUNTER — OFFICE VISIT (OUTPATIENT)
Dept: PEDIATRICS | Facility: OTHER | Age: 55
End: 2025-02-24
Attending: INTERNAL MEDICINE
Payer: COMMERCIAL

## 2025-02-24 VITALS
HEART RATE: 88 BPM | RESPIRATION RATE: 16 BRPM | DIASTOLIC BLOOD PRESSURE: 92 MMHG | OXYGEN SATURATION: 97 % | HEIGHT: 73 IN | BODY MASS INDEX: 30.43 KG/M2 | WEIGHT: 229.6 LBS | SYSTOLIC BLOOD PRESSURE: 148 MMHG | TEMPERATURE: 97.4 F

## 2025-02-24 DIAGNOSIS — R09.89 CHRONIC THROAT CLEARING: ICD-10-CM

## 2025-02-24 DIAGNOSIS — Z12.5 SCREENING FOR PROSTATE CANCER: ICD-10-CM

## 2025-02-24 DIAGNOSIS — K44.9 HIATAL HERNIA: ICD-10-CM

## 2025-02-24 DIAGNOSIS — R73.09 ELEVATED GLUCOSE LEVEL: ICD-10-CM

## 2025-02-24 DIAGNOSIS — G89.29 CHRONIC ABDOMINAL PAIN: ICD-10-CM

## 2025-02-24 DIAGNOSIS — Z13.0 SCREENING, ANEMIA, DEFICIENCY, IRON: ICD-10-CM

## 2025-02-24 DIAGNOSIS — K21.00 GASTROESOPHAGEAL REFLUX DISEASE WITH ESOPHAGITIS WITHOUT HEMORRHAGE: ICD-10-CM

## 2025-02-24 DIAGNOSIS — Z11.4 ENCOUNTER FOR SCREENING FOR HIV: ICD-10-CM

## 2025-02-24 DIAGNOSIS — R03.0 WHITE COAT SYNDROME WITHOUT DIAGNOSIS OF HYPERTENSION: ICD-10-CM

## 2025-02-24 DIAGNOSIS — Z13.220 LIPID SCREENING: ICD-10-CM

## 2025-02-24 DIAGNOSIS — E66.09 NON MORBID OBESITY DUE TO EXCESS CALORIES: ICD-10-CM

## 2025-02-24 DIAGNOSIS — F41.1 GAD (GENERALIZED ANXIETY DISORDER): ICD-10-CM

## 2025-02-24 DIAGNOSIS — R10.9 CHRONIC ABDOMINAL PAIN: ICD-10-CM

## 2025-02-24 DIAGNOSIS — Z00.00 ROUTINE GENERAL MEDICAL EXAMINATION AT A HEALTH CARE FACILITY: Primary | ICD-10-CM

## 2025-02-24 DIAGNOSIS — Z13.29 SCREENING FOR THYROID DISORDER: ICD-10-CM

## 2025-02-24 LAB
ANION GAP SERPL CALCULATED.3IONS-SCNC: 10 MMOL/L (ref 7–15)
BUN SERPL-MCNC: 14.6 MG/DL (ref 6–20)
CALCIUM SERPL-MCNC: 9.3 MG/DL (ref 8.8–10.4)
CHLORIDE SERPL-SCNC: 103 MMOL/L (ref 98–107)
CHOLEST SERPL-MCNC: 169 MG/DL
CREAT SERPL-MCNC: 1.13 MG/DL (ref 0.67–1.17)
CRP SERPL-MCNC: <3 MG/L
EGFRCR SERPLBLD CKD-EPI 2021: 76 ML/MIN/1.73M2
ERYTHROCYTE [DISTWIDTH] IN BLOOD BY AUTOMATED COUNT: 12.3 % (ref 10–15)
ERYTHROCYTE [SEDIMENTATION RATE] IN BLOOD BY WESTERGREN METHOD: 1 MM/HR (ref 0–20)
EST. AVERAGE GLUCOSE BLD GHB EST-MCNC: 103 MG/DL
FASTING STATUS PATIENT QL REPORTED: NO
FASTING STATUS PATIENT QL REPORTED: NO
GLUCOSE SERPL-MCNC: 106 MG/DL (ref 70–99)
HBA1C MFR BLD: 5.2 %
HCO3 SERPL-SCNC: 27 MMOL/L (ref 22–29)
HCT VFR BLD AUTO: 49.4 % (ref 40–53)
HDLC SERPL-MCNC: 70 MG/DL
HGB BLD-MCNC: 17.6 G/DL (ref 13.3–17.7)
LDLC SERPL CALC-MCNC: 80 MG/DL
MCH RBC QN AUTO: 32.8 PG (ref 26.5–33)
MCHC RBC AUTO-ENTMCNC: 35.6 G/DL (ref 31.5–36.5)
MCV RBC AUTO: 92 FL (ref 78–100)
NONHDLC SERPL-MCNC: 99 MG/DL
PLATELET # BLD AUTO: 147 10E3/UL (ref 150–450)
POTASSIUM SERPL-SCNC: 3.9 MMOL/L (ref 3.4–5.3)
PSA SERPL DL<=0.01 NG/ML-MCNC: 1.65 NG/ML (ref 0–3.5)
RBC # BLD AUTO: 5.37 10E6/UL (ref 4.4–5.9)
SODIUM SERPL-SCNC: 140 MMOL/L (ref 135–145)
TRIGL SERPL-MCNC: 94 MG/DL
TSH SERPL DL<=0.005 MIU/L-ACNC: 3.61 UIU/ML (ref 0.3–4.2)
WBC # BLD AUTO: 8.9 10E3/UL (ref 4–11)

## 2025-02-24 PROCEDURE — 80061 LIPID PANEL: CPT | Mod: ZL | Performed by: INTERNAL MEDICINE

## 2025-02-24 PROCEDURE — G0103 PSA SCREENING: HCPCS | Mod: ZL | Performed by: INTERNAL MEDICINE

## 2025-02-24 PROCEDURE — 86140 C-REACTIVE PROTEIN: CPT | Mod: ZL | Performed by: INTERNAL MEDICINE

## 2025-02-24 PROCEDURE — 36415 COLL VENOUS BLD VENIPUNCTURE: CPT | Mod: ZL | Performed by: INTERNAL MEDICINE

## 2025-02-24 PROCEDURE — 80048 BASIC METABOLIC PNL TOTAL CA: CPT | Mod: ZL | Performed by: INTERNAL MEDICINE

## 2025-02-24 PROCEDURE — 85652 RBC SED RATE AUTOMATED: CPT | Mod: ZL | Performed by: INTERNAL MEDICINE

## 2025-02-24 PROCEDURE — 86364 TISS TRNSGLTMNASE EA IG CLAS: CPT | Mod: ZL | Performed by: INTERNAL MEDICINE

## 2025-02-24 PROCEDURE — 85027 COMPLETE CBC AUTOMATED: CPT | Mod: ZL | Performed by: INTERNAL MEDICINE

## 2025-02-24 PROCEDURE — 87389 HIV-1 AG W/HIV-1&-2 AB AG IA: CPT | Mod: ZL | Performed by: INTERNAL MEDICINE

## 2025-02-24 PROCEDURE — 84443 ASSAY THYROID STIM HORMONE: CPT | Mod: ZL | Performed by: INTERNAL MEDICINE

## 2025-02-24 PROCEDURE — 83036 HEMOGLOBIN GLYCOSYLATED A1C: CPT | Mod: ZL | Performed by: INTERNAL MEDICINE

## 2025-02-24 RX ORDER — CITALOPRAM HYDROBROMIDE 10 MG/1
10 TABLET ORAL DAILY
Qty: 90 TABLET | Refills: 4 | Status: SHIPPED | OUTPATIENT
Start: 2025-02-24

## 2025-02-24 RX ORDER — SUCRALFATE 1 G/1
1 TABLET ORAL 4 TIMES DAILY
Qty: 300 TABLET | Refills: 11 | Status: SHIPPED | OUTPATIENT
Start: 2025-02-24

## 2025-02-24 RX ORDER — OMEPRAZOLE 20 MG/1
20 TABLET, DELAYED RELEASE ORAL DAILY
Qty: 90 TABLET | Refills: 4 | Status: SHIPPED | OUTPATIENT
Start: 2025-02-24

## 2025-02-24 RX ORDER — OMEPRAZOLE 20 MG/1
20 TABLET, DELAYED RELEASE ORAL DAILY
COMMUNITY
End: 2025-02-24

## 2025-02-24 ASSESSMENT — PAIN SCALES - GENERAL: PAINLEVEL_OUTOF10: MILD PAIN (1)

## 2025-02-24 NOTE — PATIENT INSTRUCTIONS
-- Elevate head of bed   -- Move largest meal earlier in the day, small one late   -- Sinus irrigating   -- Start flonase   -- Use masks with yessi environments   -- If not improved in 6 weeks, would refer to ENT     -- Try nasal saline irrigation (with distilled water)  Nasal saline spray  NeilMed sinus rinse  Neti pot   -- Start nasal steroid spray daily (eg Nasacort, Flonase)   -- When using steroid spray: tilt head forward, spray away from center septum, don't sniff deeply during inhalation.   -- Steroid spray works best when used consistently, not as needed.   -- Okay to start daily Claritin/Allegra/Zyrtec (without -D), can help for sneezing, itchy eyes, etc.   -- Okay to use diphenhydramine (Benadryl) as needed for sneezing, nasal congestion, can cause dry mouth, urinary retention, blurry vision, constipation   -- Okay to briefly use oxymetazoline (Afrin) 2 sprays both nostrils, nightly for 3-4 days, then stop as can cause rebound congestion   -- Shower/bathe before bed to wash pollen away   -- Elevate head of bed to facilitate sinus drainage   -- Consider getting a HEPA filter   -- Use a cool mist humidifier during the dry season, clean weekly with vinegar       -- Continue omeprazole (Prilosec) 20 mg daily   -- Use famotidine (Pepcid) 20 mg twice daily as needed for heart burn, upset stomach.   -- Use sucralfate (Carafate) 2-3 times per day to allow stomach lining to heal   -- Antacids are okay to use as well as needed (eg Tums)   -- Avoid trigger foods (eg spicy foods, caffeine, alcohol -- see longer list below)   -- Larger meal earlier in the day, smallest meal later in the day   -- Avoid NSAIDs (eg ibuprofen/Advil, naproxen/Aleve)   -- Work on healthy weight   -- Reduce stress in your life   -- After your symptoms have improved (around 30 days) consider stopping omeprazole   -- Okay to continue famotidine as needed   -- If symptoms persist or worsen, return as we would consider obtaining endoscopy     --  Trial citalopram   -- Consider seeing a therapist        Patient Education      Patient Education   Lifestyle Changes for Controlling GERD  When you have GERD, stomach acid feels as if it s backing up toward your mouth. Whether or not you take medicine to control your GERD, your symptoms can often be improved with lifestyle changes. Talk to your healthcare provider about the following suggestions. These suggestions may help you get relief from your symptoms.      Raise your head  Reflux is more likely to strike when you re lying down flat, because stomach fluid can flow backward more easily. Raising the head of your bed 4 to 6 inches can help. To do this:  Slide blocks or books under the legs at the head of your bed. Or, place a wedge under the mattress. Many [employer] can make a suitable wedge for you. The wedge should run from your waist to the top of your head.  Don t just prop your head on several pillows. This increases pressure on your stomach. It can make GERD worse.  Watch your eating habits  Certain foods may increase the acid in your stomach or relax the lower esophageal sphincter. This makes GERD more likely. It s best to avoid the following if they cause you symptoms:  Coffee, tea, and carbonated drinks (with and without caffeine)  Fatty, fried, or spicy food  Mint, chocolate, onions, and tomatoes  Peppermint  Any other foods that seem to irritate your stomach or cause you pain  Relieve the pressure  Tips include the following:  Eat smaller meals, even if you have to eat more often.  Don t lie down right after you eat. Wait a few hours for your stomach to empty.  Avoid tight belts and tight-fitting clothes.  Lose excess weight.  Tobacco and alcohol  Avoid smoking tobacco and drinking alcohol. They can make GERD symptoms worse.  Date Last Reviewed: 7/1/2016 2000-2017 [employer]. [address] [place] [address], [place] [address]. All rights reserved. This information is not intended as a  substitute for professional medical care. Always follow your healthcare professional's instructions.    Patient Education   Preventive Care Advice   This is general advice given by our system to help you stay healthy. However, your care team may have specific advice just for you. Please talk to your care team about your preventive care needs.  Nutrition  Eat 5 or more servings of fruits and vegetables each day.  Try wheat bread, brown rice and whole grain pasta (instead of white bread, rice, and pasta).  Get enough calcium and vitamin D. Check the label on foods and aim for 100% of the RDA (recommended daily allowance).  Lifestyle  Exercise at least 150 minutes each week  (30 minutes a day, 5 days a week).  Do muscle strengthening activities 2 days a week. These help control your weight and prevent disease.  No smoking.  Wear sunscreen to prevent skin cancer.  Have a dental exam and cleaning every 6 months.  Yearly exams  See your health care team every year to talk about:  Any changes in your health.  Any medicines your care team has prescribed.  Preventive care, family planning, and ways to prevent chronic diseases.  Shots (vaccines)   HPV shots (up to age 26), if you've never had them before.  Hepatitis B shots (up to age 59), if you've never had them before.  COVID-19 shot: Get this shot when it's due.  Flu shot: Get a flu shot every year.  Tetanus shot: Get a tetanus shot every 10 years.  Pneumococcal, hepatitis A, and RSV shots: Ask your care team if you need these based on your risk.  Shingles shot (for age 50 and up)  General health tests  Diabetes screening:  Starting at age 35, Get screened for diabetes at least every 3 years.  If you are younger than age 35, ask your care team if you should be screened for diabetes.  Cholesterol test: At age 39, start having a cholesterol test every 5 years, or more often if advised.  Bone density scan (DEXA): At age 50, ask your care team if you should have this scan for  osteoporosis (brittle bones).  Hepatitis C: Get tested at least once in your life.  STIs (sexually transmitted infections)  Before age 24: Ask your care team if you should be screened for STIs.  After age 24: Get screened for STIs if you're at risk. You are at risk for STIs (including HIV) if:  You are sexually active with more than one person.  You don't use condoms every time.  You or a partner was diagnosed with a sexually transmitted infection.  If you are at risk for HIV, ask about PrEP medicine to prevent HIV.  Get tested for HIV at least once in your life, whether you are at risk for HIV or not.  Cancer screening tests  Cervical cancer screening: If you have a cervix, begin getting regular cervical cancer screening tests starting at age 21.  Breast cancer scan (mammogram): If you've ever had breasts, begin having regular mammograms starting at age 40. This is a scan to check for breast cancer.  Colon cancer screening: It is important to start screening for colon cancer at age 45.  Have a colonoscopy test every 10 years (or more often if you're at risk) Or, ask your provider about stool tests like a FIT test every year or Cologuard test every 3 years.  To learn more about your testing options, visit:   .  For help making a decision, visit:   https://bit.ly/xy03806.  Prostate cancer screening test: If you have a prostate, ask your care team if a prostate cancer screening test (PSA) at age 55 is right for you.  Lung cancer screening: If you are a current or former smoker ages 50 to 80, ask your care team if ongoing lung cancer screenings are right for you.  For informational purposes only. Not to replace the advice of your health care provider. Copyright   2023 [employer]. All rights reserved. Clinically reviewed by the [facility]. Vamo [id number] - REV 01/24.    [place] counselors/therapists   Telephone Kids? Address   [facility]  [phone] Yes, 12+ [address]  [email or web address]   [facility]  (Many counselors) [phone] Yes [address]   [email or web address]   [facility]  (Many counselors) [phone] Yes [address]   [email or web address]   [facility]  (Many counselors) [phone] Yes [address]  [email or web address]   [facility]  Bill Lechuga [phone] Yes [facility]  [address]  [email or web address]   [facility]  Christos Kay [phone] Yes [address]   [address]  [email or web address]   Uzma Cortes [phone]  [address]  [email or web address]   [facility]  Kevin Damian  And others... [phone]  [address]  [email or web address]   Tiffanie Areli [phone]  [address]   Rosa Gibson [phone]  [address]   Maren Kong [phone] Yes [address]   Benji Carnes [phone]  [address] [place]    Dequan Mckinnon [phone]  [address]   [facility]  Shellie Pereira [phone]  [address] [place] [address]    Odette Norm [phone]  [address]  ([facility])  [email or web address]   [facility]  David Chadwick [phone]  [address]  [email or web address]  [email or web address]   [facility]  Michele Rinne Cindy Thomas [phone]     [facility]: Mari [phone] Yes TORRIE Guerra  [address]  [email or web address]   [facility]: [place] [phone] Yes TORRIE Lozano  [address] [facility]  [email or web address]

## 2025-02-24 NOTE — NURSING NOTE
"Chief Complaint   Patient presents with    Physical     Patient presents with wife for annual physical.  Patient has concerns about \"throat clearing\".  Also reports continuous ringing in ears bilaterally.      Initial BP (!) 142/84 (BP Location: Right arm, Patient Position: Sitting, Cuff Size: Adult Large)   Pulse 88   Temp 97.4  F (36.3  C) (Temporal)   Resp 16   Ht 1.861 m (6' 1.25\")   Wt 104.1 kg (229 lb 9.6 oz)   SpO2 97%   BMI 30.09 kg/m   Estimated body mass index is 30.09 kg/m  as calculated from the following:    Height as of this encounter: 1.861 m (6' 1.25\").    Weight as of this encounter: 104.1 kg (229 lb 9.6 oz).  Medication Review: complete    The next two questions are to help us understand your food security.  If you are feeling you need any assistance in this area, we have resources available to support you today.          2/19/2025   SDOH- Food Insecurity   Within the past 12 months, did you worry that your food would run out before you got money to buy more? N   Within the past 12 months, did the food you bought just not last and you didn t have money to get more? N         Health Care Directive:  Patient does not have a Health Care Directive: Discussed advance care planning with patient; however, patient declined at this time.    Juanis Travis LPN on 2/24/2025 at 8:38 AM      "

## 2025-02-24 NOTE — PROGRESS NOTES
Preventive Care Visit  [facility] AND [facility]  Js Patel MD, Internal Medicine  Feb 24, 2025        ICD-10-CM    1. Routine general medical examination at a health care facility  Z00.00       2. Chronic throat clearing  R09.89       3. Gastroesophageal reflux disease with esophagitis without hemorrhage  K21.00 sucralfate (CARAFATE) 1 GM tablet     omeprazole (PRILOSEC OTC) 20 MG EC tablet      4. Non morbid obesity due to excess calories  E66.09       5. Hiatal hernia  K44.9       6. White coat syndrome without diagnosis of hypertension  R03.0 Basic metabolic panel     Basic metabolic panel      7. Screening for prostate cancer  Z12.5 Prostate Specific Antigen Screen     Prostate Specific Antigen Screen      8. Elevated glucose level  R73.09 Hemoglobin A1c     Basic metabolic panel     Hemoglobin A1c     Basic metabolic panel      9. Screening, anemia, deficiency, iron  Z13.0 CBC with platelets     CBC with platelets     CANCELED: RBC and Platelet Morphology      10. Screening for thyroid disorder  Z13.29 TSH with free T4 reflex     TSH with free T4 reflex      11. Lipid screening  Z13.220 Lipid Profile     Lipid Profile      12. Encounter for screening for HIV  Z11.4 HIV Antigen Antibody Combo Cascade     HIV Antigen Antibody Combo Cascade      13. Chronic abdominal pain  R10.9 Tissue transglutaminase Ab IgA and IgG    G89.29 Erythrocyte sedimentation rate auto     CRP inflammation     Tissue transglutaminase Ab IgA and IgG     Erythrocyte sedimentation rate auto     CRP inflammation      14. CHRISTOPHER (generalized anxiety disorder)  F41.1 citalopram (CELEXA) 10 MG tablet          Assessment & Plan  Chronic Throat Clearing    Intermittent throat clearing, more noticeable during quiet activities like hunting. Symptoms are less bothersome at night and in the morning. Possible causes include GERD and chronic sinusitis. GERD is likely due to history of heartburn and use of omeprazole. Chronic sinusitis  is considered due to occasional nasal congestion and foul smell. Discussed that GERD can cause throat irritation due to acid reflux, and chronic sinusitis can cause postnasal drip. If symptoms persist despite initial treatments, referral to ENT may be necessary to rule out more serious conditions like cancer. Smoking history increases the risk of throat cancer.    - Elevate the head of the bed by 15 degrees    - Eat largest meal earlier in the day    - Avoid lying down immediately after eating    - Avoid greasy foods and heavy protein loads in the evening    - Continue weight loss efforts    - Use sinus irrigation (Neti pot or Nithin Med rinse)    - Start Flonase (intranasal steroid spray) for six weeks    - Refer to ENT if symptoms persist after six weeks      Hiatal Hernia with Possible Starks's Esophagus    Sliding hiatal hernia and possible Starks's esophagus. Symptoms include pre-lunch pain and early morning discomfort. Symptoms are improved but still present. Hiatal hernia can cause discomfort and Starks's esophagus increases the risk of esophageal cancer. Carafate can help coat the stomach lining and reduce symptoms. Advised avoiding alcohol, caffeine, stress, and ibuprofen as they can exacerbate symptoms. If symptoms do not improve, surgical options like Nissen fundoplication may be considered.    - Start Carafate (sucralfate) to coat the stomach lining    - Avoid alcohol, caffeine, stress, and ibuprofen    - Consider dietary modifications to reduce heartburn triggers    - Refer to surgeon if symptoms do not improve      Irritable Bowel Syndrome (IBS)    Alternating bowel habits with morning diarrhea, sometimes yellow and liquid. Symptoms likely related to anxiety. No unintended weight loss or bloody stools. Differential includes celiac disease and smoldering autoimmune disease. IBS can be exacerbated by stress and anxiety. Antidepressants can help manage anxiety-related IBS but may initially worsen  anxiety. Non-pharmacological options like counseling were also discussed.    - Check for celiac disease    - Check inflammatory markers    - Consider starting an antidepressant for anxiety-related IBS    - Refer to counselor if he prefers non-pharmacological approach      Hypertension    Blood pressure reading of 142/84 in the clinic, likely white coat syndrome. Home readings are usually better. Anxiety in clinical settings may contribute to elevated readings.    - Recheck blood pressure in the clinic    - Monitor blood pressure at home      General Health Maintenance    Discussed routine health maintenance including vaccinations and screenings. He has not had a PSA test and is eligible for several vaccinations. Explained the importance of PSA testing for early detection of prostate cancer. Discussed the benefits of the shingles vaccine, especially given the potential for severe and long-lasting pain from shingles. Other vaccines like COVID booster, flu shot, tetanus booster, and pneumonia shot were also discussed.    - Check PSA    - Administer shingles vaccine (two-dose series)    - Consider COVID booster, flu shot, tetanus booster, and pneumonia shot      Follow-up    - Schedule follow-up appointment in six weeks.        Elvira Vincent is a 54 year old, presenting for the following:  Physical    History of Present Illness  The patient, with a history of smoking and significant weight loss post-long term, presents with chronic throat clearing, which has been particularly noticeable during quiet periods. The symptom is not constant, with the patient reporting no issues during sleep and minimal issues in the morning until movement begins. The patient also reports occasional shortness of breath, which he attributes to aging and decreased activity levels since long term.    The patient also reports stomach issues, particularly around lunchtime and early morning. He describes a pain in the stomach area that leads  to a fever-like feeling, although this symptom has been decreasing in intensity. The patient also reports noisy bowel movements, particularly in the morning, sometimes presenting as liquid yellow stools. The patient acknowledges a daily alcohol intake, which may contribute to these symptoms.    The patient also expresses a high level of anxiety, particularly in medical settings, which he attributes to traumatic experiences in the past. The patient reports difficulty in letting go of past experiences and intrusive thoughts, which may contribute to his anxiety levels.          2/24/2025     8:31 AM   Additional Questions   Roomed by Juanis Travis LPN   Accompanied by wife - Yvette          DANUTA          Health Care Directive  Patient does not have a Health Care Directive:       2/19/2025   General Health   How would you rate your overall physical health? Good   Feel stress (tense, anxious, or unable to sleep) Only a little   (!) STRESS CONCERN      2/19/2025   Nutrition   Three or more servings of calcium each day? Yes   Diet: Regular (no restrictions)   How many servings of fruit and vegetables per day? (!) 0-1   How many sweetened beverages each day? 0-1         2/19/2025   Exercise   Days per week of moderate/strenous exercise 2 days   Average minutes spent exercising at this level 20 min   (!) EXERCISE CONCERN      2/19/2025   Social Factors   Frequency of gathering with friends or relatives More than three times a week   Worry food won't last until get money to buy more No   Food not last or not have enough money for food? No   Do you have housing? (Housing is defined as stable permanent housing and does not include staying ouside in a car, in a tent, in an abandoned building, in an overnight shelter, or couch-surfing.) Yes   Are you worried about losing your housing? No   Lack of transportation? No   Unable to get utilities (heat,electricity)? No         2/19/2025   Fall Risk   Fallen 2 or more times in the past  "year? No   Trouble with walking or balance? No          2025   Dental   Dentist two times every year? Yes            Today's PHQ-2 Score:       2025    10:14 AM   PHQ-2 (  [employer])   Q1: Little interest or pleasure in doing things 0   Q2: Feeling down, depressed or hopeless 0   PHQ-2 Score 0    Q1: Little interest or pleasure in doing things Not at all   Q2: Feeling down, depressed or hopeless Not at all   PHQ-2 Score 0       Patient-reported           2025   Substance Use   Alcohol more than 3/day or more than 7/wk Not Applicable   Do you use any other substances recreationally? No     Social History     Tobacco Use    Smoking status: Former     Current packs/day: 0.00     Average packs/day: 2.0 packs/day for 18.0 years (36.0 ttl pk-yrs)     Types: Cigarettes     Start date: 10/15/1988     Quit date: 10/15/2006     Years since quittin.3    Smokeless tobacco: Never   Vaping Use    Vaping status: Never Used   Substance Use Topics    Alcohol use: Yes     Alcohol/week: 2.0 standard drinks of alcohol     Types: 2 Cans of beer per week    Drug use: Never             2025   One time HIV Screening   Previous HIV test? No         2025   STI Screening   New sexual partner(s) since last STI/HIV test? No   Last PSA: No results found for: \"PSA\"  ASCVD Risk   The ASCVD Risk score (Michael HERRERA, et al., 2019) failed to calculate for the following reasons:    Cannot find a previous HDL lab    Cannot find a previous total cholesterol lab           Reviewed and updated as needed this visit by Provider   Tobacco  Allergies  Meds  Problems  Med Hx  Surg Hx  Fam Hx                     Objective    Exam  BP (!) 148/92 (BP Location: Right arm, Patient Position: Sitting, Cuff Size: Adult Large)   Pulse 88   Temp 97.4  F (36.3  C) (Temporal)   Resp 16   Ht 1.861 m (6' 1.25\")   Wt 104.1 kg (229 lb 9.6 oz)   SpO2 97%   BMI 30.09 kg/m     Estimated body mass index is 30.09 kg/m  as " "calculated from the following:    Height as of this encounter: 1.861 m (6' 1.25\").    Weight as of this encounter: 104.1 kg (229 lb 9.6 oz).    Physical Exam  Physical Exam  VITALS: BP- 142/84  CHEST: Lungs clear to auscultation, no wheezing.  CARDIOVASCULAR: Regular rate and rhythm, no murmurs. No bruits.  ABDOMEN: Bowel sounds hyperactive.            Signed Electronically by: Js Patel MD    "

## 2025-02-25 LAB
HIV 1+2 AB+HIV1 P24 AG SERPL QL IA: NONREACTIVE
TTG IGA SER-ACNC: 1.4 U/ML
TTG IGG SER-ACNC: 1 U/ML

## (undated) DEVICE — ENDO BITE BLOCK 60 MAXI LF 00712804

## (undated) DEVICE — TUBING SUCTION 10'X3/16" N510

## (undated) DEVICE — SUCTION MANIFOLD NEPTUNE 2 SYS 4 PORT 0702-020-000

## (undated) DEVICE — SYR 50ML LL W/O NDL 309653

## (undated) DEVICE — ENDO FORCEP ENDOJAW BIOPSY 2.8MMX230CM FB-220U

## (undated) DEVICE — Device

## (undated) DEVICE — ENDO BRUSH CHANNEL MASTER CLEANING 2-4.2MM BW-412T

## (undated) DEVICE — ENDO KIT COMPLIANCE DYKENDOCMPLY

## (undated) DEVICE — SOL WATER 1500ML

## (undated) RX ORDER — PROPOFOL 10 MG/ML
INJECTION, EMULSION INTRAVENOUS
Status: DISPENSED
Start: 2022-07-21